# Patient Record
Sex: FEMALE | Race: BLACK OR AFRICAN AMERICAN | NOT HISPANIC OR LATINO | URBAN - METROPOLITAN AREA
[De-identification: names, ages, dates, MRNs, and addresses within clinical notes are randomized per-mention and may not be internally consistent; named-entity substitution may affect disease eponyms.]

---

## 2017-03-29 ENCOUNTER — EMERGENCY (EMERGENCY)
Facility: HOSPITAL | Age: 37
LOS: 1 days | Discharge: HOME | End: 2017-03-29
Attending: EMERGENCY MEDICINE | Admitting: EMERGENCY MEDICINE
Payer: COMMERCIAL

## 2017-03-29 VITALS
DIASTOLIC BLOOD PRESSURE: 64 MMHG | RESPIRATION RATE: 20 BRPM | SYSTOLIC BLOOD PRESSURE: 127 MMHG | HEART RATE: 88 BPM | TEMPERATURE: 98 F | OXYGEN SATURATION: 100 %

## 2017-03-29 DIAGNOSIS — Z98.89 OTHER SPECIFIED POSTPROCEDURAL STATES: Chronic | ICD-10-CM

## 2017-03-29 PROCEDURE — 99283 EMERGENCY DEPT VISIT LOW MDM: CPT

## 2017-03-29 NOTE — ED PROVIDER NOTE - MEDICAL DECISION MAKING DETAILS
37yo F presents with URI Sx. Likely viral. Plan: d/c home, recommended Tylenol, hydation, rest, f/u with PMD, return precautions given. 37yo F presents with URI Sx cw viral URI. Plan: d/c home, recommended Tylenol, hydation, rest, f/u with PMD, return precautions given.

## 2017-03-29 NOTE — ED PROVIDER NOTE - OBJECTIVE STATEMENT
35yo F with no significant PMHx, presents to ED c/o throat pain, dysphagia, intermittent chills for x4d. Pt was at [facility] ED x5d ago visiting her sister. She was sent to ED by her workplace today for evaluation. LNMP a "few" days ago. Denies fever, abd pain, sick contacts. Flu shot not UTD. NKDA.

## 2017-03-29 NOTE — ED PROVIDER NOTE - NS ED MD SCRIBE ATTENDING SCRIBE SECTIONS
VITAL SIGNS( Pullset)/HISTORY OF PRESENT ILLNESS/REVIEW OF SYSTEMS/HIV/DISPOSITION/PAST MEDICAL/SURGICAL/SOCIAL HISTORY/PHYSICAL EXAM

## 2018-04-18 ENCOUNTER — RESULT REVIEW (OUTPATIENT)
Age: 38
End: 2018-04-18

## 2018-08-02 ENCOUNTER — APPOINTMENT (OUTPATIENT)
Dept: ANTEPARTUM | Facility: CLINIC | Age: 38
End: 2018-08-02

## 2018-08-02 ENCOUNTER — ASOB RESULT (OUTPATIENT)
Age: 38
End: 2018-08-02

## 2018-08-02 ENCOUNTER — APPOINTMENT (OUTPATIENT)
Dept: ANTEPARTUM | Facility: CLINIC | Age: 38
End: 2018-08-02
Payer: COMMERCIAL

## 2018-08-02 PROCEDURE — 36415 COLL VENOUS BLD VENIPUNCTURE: CPT

## 2018-08-02 PROCEDURE — 76801 OB US < 14 WKS SINGLE FETUS: CPT

## 2018-08-02 PROCEDURE — 36416 COLLJ CAPILLARY BLOOD SPEC: CPT

## 2018-08-02 PROCEDURE — 99241 OFFICE CONSULTATION NEW/ESTAB PATIENT 15 MIN: CPT | Mod: 25

## 2018-08-02 PROCEDURE — 76813 OB US NUCHAL MEAS 1 GEST: CPT

## 2018-10-22 ENCOUNTER — ASOB RESULT (OUTPATIENT)
Age: 38
End: 2018-10-22

## 2018-10-22 ENCOUNTER — APPOINTMENT (OUTPATIENT)
Dept: ANTEPARTUM | Facility: CLINIC | Age: 38
End: 2018-10-22
Payer: COMMERCIAL

## 2018-10-22 PROCEDURE — 76811 OB US DETAILED SNGL FETUS: CPT

## 2018-10-22 PROCEDURE — 76817 TRANSVAGINAL US OBSTETRIC: CPT | Mod: 59

## 2019-01-02 ENCOUNTER — ASOB RESULT (OUTPATIENT)
Age: 39
End: 2019-01-02

## 2019-01-02 ENCOUNTER — APPOINTMENT (OUTPATIENT)
Dept: ANTEPARTUM | Facility: CLINIC | Age: 39
End: 2019-01-02
Payer: COMMERCIAL

## 2019-01-02 PROCEDURE — 76805 OB US >/= 14 WKS SNGL FETUS: CPT

## 2019-01-02 PROCEDURE — 76819 FETAL BIOPHYS PROFIL W/O NST: CPT

## 2019-01-02 PROCEDURE — 99241 OFFICE CONSULTATION NEW/ESTAB PATIENT 15 MIN: CPT | Mod: 25

## 2019-01-07 ENCOUNTER — OUTPATIENT (OUTPATIENT)
Dept: OUTPATIENT SERVICES | Age: 39
LOS: 1 days | Discharge: HOME | End: 2019-01-07

## 2019-01-07 ENCOUNTER — APPOINTMENT (OUTPATIENT)
Dept: PEDIATRIC CARDIOLOGY | Facility: CLINIC | Age: 39
End: 2019-01-07

## 2019-01-07 DIAGNOSIS — Z98.89 OTHER SPECIFIED POSTPROCEDURAL STATES: Chronic | ICD-10-CM

## 2019-01-08 ENCOUNTER — APPOINTMENT (OUTPATIENT)
Dept: PEDIATRIC CARDIOLOGY | Facility: CLINIC | Age: 39
End: 2019-01-08
Payer: COMMERCIAL

## 2019-01-08 PROCEDURE — 93325 DOPPLER ECHO COLOR FLOW MAPG: CPT | Mod: 59

## 2019-01-08 PROCEDURE — 99242 OFF/OP CONSLTJ NEW/EST SF 20: CPT | Mod: 25

## 2019-01-08 PROCEDURE — 76820 UMBILICAL ARTERY ECHO: CPT

## 2019-01-08 PROCEDURE — 76827 ECHO EXAM OF FETAL HEART: CPT

## 2019-01-08 PROCEDURE — 76825 ECHO EXAM OF FETAL HEART: CPT

## 2019-01-15 ENCOUNTER — OUTPATIENT (OUTPATIENT)
Dept: OUTPATIENT SERVICES | Facility: HOSPITAL | Age: 39
LOS: 1 days | End: 2019-01-15
Payer: COMMERCIAL

## 2019-01-15 DIAGNOSIS — O26.899 OTHER SPECIFIED PREGNANCY RELATED CONDITIONS, UNSPECIFIED TRIMESTER: ICD-10-CM

## 2019-01-15 DIAGNOSIS — Z3A.00 WEEKS OF GESTATION OF PREGNANCY NOT SPECIFIED: ICD-10-CM

## 2019-01-15 DIAGNOSIS — Z98.89 OTHER SPECIFIED POSTPROCEDURAL STATES: Chronic | ICD-10-CM

## 2019-01-15 LAB
ALBUMIN SERPL ELPH-MCNC: 3.1 G/DL — LOW (ref 3.3–5)
ALP SERPL-CCNC: 160 U/L — HIGH (ref 40–120)
ALT FLD-CCNC: 12 U/L — SIGNIFICANT CHANGE UP (ref 4–33)
ANION GAP SERPL CALC-SCNC: 13 MEQ/L — SIGNIFICANT CHANGE UP (ref 7–14)
ANISOCYTOSIS BLD QL: SLIGHT — SIGNIFICANT CHANGE UP
APPEARANCE UR: CLEAR — SIGNIFICANT CHANGE UP
APTT BLD: 24.7 SEC — LOW (ref 27.5–36.3)
AST SERPL-CCNC: 21 U/L — SIGNIFICANT CHANGE UP (ref 4–32)
BACTERIA # UR AUTO: SIGNIFICANT CHANGE UP
BASOPHILS # BLD AUTO: 0.03 K/UL — SIGNIFICANT CHANGE UP (ref 0–0.2)
BASOPHILS NFR BLD AUTO: 0.3 % — SIGNIFICANT CHANGE UP (ref 0–2)
BASOPHILS NFR SPEC: 0 % — SIGNIFICANT CHANGE UP (ref 0–2)
BILIRUB SERPL-MCNC: 0.4 MG/DL — SIGNIFICANT CHANGE UP (ref 0.2–1.2)
BILIRUB UR-MCNC: NEGATIVE — SIGNIFICANT CHANGE UP
BLASTS # FLD: 0 % — SIGNIFICANT CHANGE UP (ref 0–0)
BLOOD UR QL VISUAL: NEGATIVE — SIGNIFICANT CHANGE UP
BUN SERPL-MCNC: 7 MG/DL — SIGNIFICANT CHANGE UP (ref 7–23)
CALCIUM SERPL-MCNC: 8.7 MG/DL — SIGNIFICANT CHANGE UP (ref 8.4–10.5)
CHLORIDE SERPL-SCNC: 105 MMOL/L — SIGNIFICANT CHANGE UP (ref 98–107)
CO2 SERPL-SCNC: 19 MMOL/L — LOW (ref 22–31)
COLOR SPEC: YELLOW — SIGNIFICANT CHANGE UP
CREAT ?TM UR-MCNC: 178.9 MG/DL — SIGNIFICANT CHANGE UP
CREAT SERPL-MCNC: 0.8 MG/DL — SIGNIFICANT CHANGE UP (ref 0.5–1.3)
DACRYOCYTES BLD QL SMEAR: SLIGHT — SIGNIFICANT CHANGE UP
EOSINOPHIL # BLD AUTO: 0.06 K/UL — SIGNIFICANT CHANGE UP (ref 0–0.5)
EOSINOPHIL NFR BLD AUTO: 0.6 % — SIGNIFICANT CHANGE UP (ref 0–6)
EOSINOPHIL NFR FLD: 0.9 % — SIGNIFICANT CHANGE UP (ref 0–6)
FIBRINOGEN PPP-MCNC: 664.2 MG/DL — HIGH (ref 350–510)
GIANT PLATELETS BLD QL SMEAR: PRESENT — SIGNIFICANT CHANGE UP
GLUCOSE SERPL-MCNC: 78 MG/DL — SIGNIFICANT CHANGE UP (ref 70–99)
GLUCOSE UR-MCNC: NEGATIVE — SIGNIFICANT CHANGE UP
HCT VFR BLD CALC: 27.5 % — LOW (ref 34.5–45)
HGB BLD-MCNC: 7.7 G/DL — LOW (ref 11.5–15.5)
HYPOCHROMIA BLD QL: SIGNIFICANT CHANGE UP
IMM GRANULOCYTES NFR BLD AUTO: 1.4 % — SIGNIFICANT CHANGE UP (ref 0–1.5)
INR BLD: 1.01 — SIGNIFICANT CHANGE UP (ref 0.88–1.17)
KETONES UR-MCNC: NEGATIVE — SIGNIFICANT CHANGE UP
LDH SERPL L TO P-CCNC: 285 U/L — HIGH (ref 135–225)
LEUKOCYTE ESTERASE UR-ACNC: NEGATIVE — SIGNIFICANT CHANGE UP
LYMPHOCYTES # BLD AUTO: 1.53 K/UL — SIGNIFICANT CHANGE UP (ref 1–3.3)
LYMPHOCYTES # BLD AUTO: 14.5 % — SIGNIFICANT CHANGE UP (ref 13–44)
LYMPHOCYTES NFR SPEC AUTO: 8.7 % — LOW (ref 13–44)
MCHC RBC-ENTMCNC: 19.2 PG — LOW (ref 27–34)
MCHC RBC-ENTMCNC: 28 % — LOW (ref 32–36)
MCV RBC AUTO: 68.4 FL — LOW (ref 80–100)
METAMYELOCYTES # FLD: 0 % — SIGNIFICANT CHANGE UP (ref 0–1)
MICROCYTES BLD QL: SIGNIFICANT CHANGE UP
MONOCYTES # BLD AUTO: 0.65 K/UL — SIGNIFICANT CHANGE UP (ref 0–0.9)
MONOCYTES NFR BLD AUTO: 6.1 % — SIGNIFICANT CHANGE UP (ref 2–14)
MONOCYTES NFR BLD: 2.6 % — SIGNIFICANT CHANGE UP (ref 2–9)
MYELOCYTES NFR BLD: 0 % — SIGNIFICANT CHANGE UP (ref 0–0)
NEUTROPHIL AB SER-ACNC: 86.9 % — HIGH (ref 43–77)
NEUTROPHILS # BLD AUTO: 8.16 K/UL — HIGH (ref 1.8–7.4)
NEUTROPHILS NFR BLD AUTO: 77.1 % — HIGH (ref 43–77)
NEUTS BAND # BLD: 0 % — SIGNIFICANT CHANGE UP (ref 0–6)
NITRITE UR-MCNC: NEGATIVE — SIGNIFICANT CHANGE UP
NRBC # FLD: 0.13 K/UL — LOW (ref 25–125)
NRBC FLD-RTO: 1.2 — SIGNIFICANT CHANGE UP
OTHER - HEMATOLOGY %: 0 — SIGNIFICANT CHANGE UP
PH UR: 6.5 — SIGNIFICANT CHANGE UP (ref 5–8)
PLATELET # BLD AUTO: 300 K/UL — SIGNIFICANT CHANGE UP (ref 150–400)
PLATELET COUNT - ESTIMATE: NORMAL — SIGNIFICANT CHANGE UP
PMV BLD: 11.3 FL — SIGNIFICANT CHANGE UP (ref 7–13)
POIKILOCYTOSIS BLD QL AUTO: SLIGHT — SIGNIFICANT CHANGE UP
POLYCHROMASIA BLD QL SMEAR: SLIGHT — SIGNIFICANT CHANGE UP
POTASSIUM SERPL-MCNC: 4 MMOL/L — SIGNIFICANT CHANGE UP (ref 3.5–5.3)
POTASSIUM SERPL-SCNC: 4 MMOL/L — SIGNIFICANT CHANGE UP (ref 3.5–5.3)
PROMYELOCYTES # FLD: 0 % — SIGNIFICANT CHANGE UP (ref 0–0)
PROT SERPL-MCNC: 6 G/DL — SIGNIFICANT CHANGE UP (ref 6–8.3)
PROT UR-MCNC: 300 — HIGH
PROT UR-MCNC: 550.8 MG/DL — SIGNIFICANT CHANGE UP
PROTHROM AB SERPL-ACNC: 11.2 SEC — SIGNIFICANT CHANGE UP (ref 9.8–13.1)
RBC # BLD: 4.02 M/UL — SIGNIFICANT CHANGE UP (ref 3.8–5.2)
RBC # FLD: 18.1 % — HIGH (ref 10.3–14.5)
RBC CASTS # UR COMP ASSIST: SIGNIFICANT CHANGE UP (ref 0–?)
SODIUM SERPL-SCNC: 137 MMOL/L — SIGNIFICANT CHANGE UP (ref 135–145)
SP GR SPEC: 1.02 — SIGNIFICANT CHANGE UP (ref 1–1.04)
SQUAMOUS # UR AUTO: SIGNIFICANT CHANGE UP
URATE SERPL-MCNC: 6.1 MG/DL — SIGNIFICANT CHANGE UP (ref 2.5–7)
UROBILINOGEN FLD QL: SIGNIFICANT CHANGE UP
VARIANT LYMPHS # BLD: 0.9 % — SIGNIFICANT CHANGE UP
WBC # BLD: 10.58 K/UL — HIGH (ref 3.8–10.5)
WBC # FLD AUTO: 10.58 K/UL — HIGH (ref 3.8–10.5)
WBC UR QL: HIGH (ref 0–?)

## 2019-01-15 PROCEDURE — 76815 OB US LIMITED FETUS(S): CPT | Mod: 26

## 2019-01-15 PROCEDURE — 99213 OFFICE O/P EST LOW 20 MIN: CPT | Mod: 25

## 2019-01-15 RX ORDER — SODIUM CHLORIDE 9 MG/ML
3 INJECTION INTRAMUSCULAR; INTRAVENOUS; SUBCUTANEOUS ONCE
Qty: 0 | Refills: 0 | Status: COMPLETED | OUTPATIENT
Start: 2019-01-15 | End: 2019-01-15

## 2019-01-15 RX ADMIN — SODIUM CHLORIDE 3 MILLILITER(S): 9 INJECTION INTRAMUSCULAR; INTRAVENOUS; SUBCUTANEOUS at 13:26

## 2019-01-15 RX ADMIN — Medication 12 MILLIGRAM(S): at 13:26

## 2019-01-16 ENCOUNTER — INPATIENT (INPATIENT)
Facility: HOSPITAL | Age: 39
LOS: 5 days | Discharge: HOME | End: 2019-01-22
Attending: OBSTETRICS & GYNECOLOGY | Admitting: OBSTETRICS & GYNECOLOGY
Payer: COMMERCIAL

## 2019-01-16 VITALS — HEIGHT: 60 IN | WEIGHT: 180.78 LBS

## 2019-01-16 DIAGNOSIS — Z98.89 OTHER SPECIFIED POSTPROCEDURAL STATES: Chronic | ICD-10-CM

## 2019-01-16 DIAGNOSIS — Z3A.00 WEEKS OF GESTATION OF PREGNANCY NOT SPECIFIED: ICD-10-CM

## 2019-01-16 DIAGNOSIS — O26.899 OTHER SPECIFIED PREGNANCY RELATED CONDITIONS, UNSPECIFIED TRIMESTER: ICD-10-CM

## 2019-01-16 LAB
ALBUMIN SERPL ELPH-MCNC: 3.3 G/DL — SIGNIFICANT CHANGE UP (ref 3.3–5)
ALBUMIN SERPL ELPH-MCNC: 3.4 G/DL — SIGNIFICANT CHANGE UP (ref 3.3–5)
ALP SERPL-CCNC: 157 U/L — HIGH (ref 40–120)
ALP SERPL-CCNC: 159 U/L — HIGH (ref 40–120)
ALT FLD-CCNC: 10 U/L — SIGNIFICANT CHANGE UP (ref 4–33)
ALT FLD-CCNC: 8 U/L — SIGNIFICANT CHANGE UP (ref 4–33)
ANION GAP SERPL CALC-SCNC: 13 MEQ/L — SIGNIFICANT CHANGE UP (ref 7–14)
ANION GAP SERPL CALC-SCNC: 15 MMO/L — HIGH (ref 7–14)
APPEARANCE UR: CLEAR — SIGNIFICANT CHANGE UP
APTT BLD: 23.5 SEC — LOW (ref 27.5–36.3)
AST SERPL-CCNC: 23 U/L — SIGNIFICANT CHANGE UP (ref 4–32)
AST SERPL-CCNC: 30 U/L — SIGNIFICANT CHANGE UP (ref 4–32)
BACTERIA # UR AUTO: SIGNIFICANT CHANGE UP
BASOPHILS # BLD AUTO: 0.01 K/UL — SIGNIFICANT CHANGE UP (ref 0–0.2)
BASOPHILS NFR BLD AUTO: 0.1 % — SIGNIFICANT CHANGE UP (ref 0–2)
BILIRUB SERPL-MCNC: 0.3 MG/DL — SIGNIFICANT CHANGE UP (ref 0.2–1.2)
BILIRUB SERPL-MCNC: 0.4 MG/DL — SIGNIFICANT CHANGE UP (ref 0.2–1.2)
BILIRUB UR-MCNC: NEGATIVE — SIGNIFICANT CHANGE UP
BLD GP AB SCN SERPL QL: NEGATIVE — SIGNIFICANT CHANGE UP
BLOOD UR QL VISUAL: NEGATIVE — SIGNIFICANT CHANGE UP
BUN SERPL-MCNC: 9 MG/DL — SIGNIFICANT CHANGE UP (ref 7–23)
BUN SERPL-MCNC: 9 MG/DL — SIGNIFICANT CHANGE UP (ref 7–23)
CALCIUM SERPL-MCNC: 9 MG/DL — SIGNIFICANT CHANGE UP (ref 8.4–10.5)
CALCIUM SERPL-MCNC: 9.1 MG/DL — SIGNIFICANT CHANGE UP (ref 8.4–10.5)
CHLORIDE SERPL-SCNC: 104 MMOL/L — SIGNIFICANT CHANGE UP (ref 98–107)
CHLORIDE SERPL-SCNC: 104 MMOL/L — SIGNIFICANT CHANGE UP (ref 98–107)
CO2 SERPL-SCNC: 18 MMOL/L — LOW (ref 22–31)
CO2 SERPL-SCNC: 19 MMOL/L — LOW (ref 22–31)
COLOR SPEC: YELLOW — SIGNIFICANT CHANGE UP
CREAT ?TM UR-MCNC: 195.5 MG/DL — SIGNIFICANT CHANGE UP
CREAT SERPL-MCNC: 0.81 MG/DL — SIGNIFICANT CHANGE UP (ref 0.5–1.3)
CREAT SERPL-MCNC: 0.83 MG/DL — SIGNIFICANT CHANGE UP (ref 0.5–1.3)
EOSINOPHIL # BLD AUTO: 0 K/UL — SIGNIFICANT CHANGE UP (ref 0–0.5)
EOSINOPHIL NFR BLD AUTO: 0 % — SIGNIFICANT CHANGE UP (ref 0–6)
FIBRINOGEN PPP-MCNC: 660 MG/DL — HIGH (ref 350–510)
GLUCOSE SERPL-MCNC: 115 MG/DL — HIGH (ref 70–99)
GLUCOSE SERPL-MCNC: 91 MG/DL — SIGNIFICANT CHANGE UP (ref 70–99)
GLUCOSE UR-MCNC: NEGATIVE — SIGNIFICANT CHANGE UP
HCT VFR BLD CALC: 25.3 % — LOW (ref 34.5–45)
HGB BLD-MCNC: 7.2 G/DL — LOW (ref 11.5–15.5)
IMM GRANULOCYTES NFR BLD AUTO: 2.7 % — HIGH (ref 0–1.5)
INR BLD: 0.96 — SIGNIFICANT CHANGE UP (ref 0.88–1.17)
KETONES UR-MCNC: NEGATIVE — SIGNIFICANT CHANGE UP
LDH SERPL L TO P-CCNC: 302 U/L — HIGH (ref 135–225)
LDH SERPL L TO P-CCNC: 414 U/L — HIGH (ref 135–225)
LEUKOCYTE ESTERASE UR-ACNC: NEGATIVE — SIGNIFICANT CHANGE UP
LYMPHOCYTES # BLD AUTO: 1.03 K/UL — SIGNIFICANT CHANGE UP (ref 1–3.3)
LYMPHOCYTES # BLD AUTO: 5.7 % — LOW (ref 13–44)
MCHC RBC-ENTMCNC: 19.6 PG — LOW (ref 27–34)
MCHC RBC-ENTMCNC: 28.5 % — LOW (ref 32–36)
MCV RBC AUTO: 68.9 FL — LOW (ref 80–100)
MONOCYTES # BLD AUTO: 0.89 K/UL — SIGNIFICANT CHANGE UP (ref 0–0.9)
MONOCYTES NFR BLD AUTO: 4.9 % — SIGNIFICANT CHANGE UP (ref 2–14)
NEUTROPHILS # BLD AUTO: 15.77 K/UL — HIGH (ref 1.8–7.4)
NEUTROPHILS NFR BLD AUTO: 86.6 % — HIGH (ref 43–77)
NITRITE UR-MCNC: NEGATIVE — SIGNIFICANT CHANGE UP
NRBC # FLD: 0.25 K/UL — LOW (ref 25–125)
NRBC FLD-RTO: 1.4 — SIGNIFICANT CHANGE UP
PH UR: 6.5 — SIGNIFICANT CHANGE UP (ref 5–8)
PLATELET # BLD AUTO: 289 K/UL — SIGNIFICANT CHANGE UP (ref 150–400)
PMV BLD: 10.9 FL — SIGNIFICANT CHANGE UP (ref 7–13)
POTASSIUM SERPL-MCNC: 4.3 MMOL/L — SIGNIFICANT CHANGE UP (ref 3.5–5.3)
POTASSIUM SERPL-MCNC: 4.9 MMOL/L — SIGNIFICANT CHANGE UP (ref 3.5–5.3)
POTASSIUM SERPL-SCNC: 4.3 MMOL/L — SIGNIFICANT CHANGE UP (ref 3.5–5.3)
POTASSIUM SERPL-SCNC: 4.9 MMOL/L — SIGNIFICANT CHANGE UP (ref 3.5–5.3)
PROT SERPL-MCNC: 5.8 G/DL — LOW (ref 6–8.3)
PROT SERPL-MCNC: 6.3 G/DL — SIGNIFICANT CHANGE UP (ref 6–8.3)
PROT UR-MCNC: 300 — HIGH
PROT UR-MCNC: 424.4 MG/DL — SIGNIFICANT CHANGE UP
PROTHROM AB SERPL-ACNC: 10.9 SEC — SIGNIFICANT CHANGE UP (ref 9.8–13.1)
RBC # BLD: 3.67 M/UL — LOW (ref 3.8–5.2)
RBC # FLD: 18.2 % — HIGH (ref 10.3–14.5)
RBC CASTS # UR COMP ASSIST: SIGNIFICANT CHANGE UP (ref 0–?)
RH IG SCN BLD-IMP: POSITIVE — SIGNIFICANT CHANGE UP
SODIUM SERPL-SCNC: 136 MMOL/L — SIGNIFICANT CHANGE UP (ref 135–145)
SODIUM SERPL-SCNC: 137 MMOL/L — SIGNIFICANT CHANGE UP (ref 135–145)
SP GR SPEC: 1.02 — SIGNIFICANT CHANGE UP (ref 1–1.04)
SQUAMOUS # UR AUTO: SIGNIFICANT CHANGE UP
URATE SERPL-MCNC: 6.4 MG/DL — SIGNIFICANT CHANGE UP (ref 2.5–7)
URATE SERPL-MCNC: 6.4 MG/DL — SIGNIFICANT CHANGE UP (ref 2.5–7)
UROBILINOGEN FLD QL: NORMAL — SIGNIFICANT CHANGE UP
WBC # BLD: 18.2 K/UL — HIGH (ref 3.8–10.5)
WBC # FLD AUTO: 18.2 K/UL — HIGH (ref 3.8–10.5)

## 2019-01-16 RX ORDER — SODIUM CHLORIDE 9 MG/ML
3 INJECTION INTRAMUSCULAR; INTRAVENOUS; SUBCUTANEOUS ONCE
Qty: 0 | Refills: 0 | Status: COMPLETED | OUTPATIENT
Start: 2019-01-16 | End: 2019-01-16

## 2019-01-16 RX ORDER — SODIUM CHLORIDE 9 MG/ML
1000 INJECTION, SOLUTION INTRAVENOUS
Qty: 0 | Refills: 0 | Status: DISCONTINUED | OUTPATIENT
Start: 2019-01-16 | End: 2019-01-16

## 2019-01-16 RX ADMIN — Medication 12 MILLIGRAM(S): at 14:33

## 2019-01-16 RX ADMIN — SODIUM CHLORIDE 3 MILLILITER(S): 9 INJECTION INTRAMUSCULAR; INTRAVENOUS; SUBCUTANEOUS at 14:16

## 2019-01-16 NOTE — PATIENT PROFILE OB - COMFORT LEVEL, ACCEPTABLE
3
Foreign body  removed in left foot as a child  No significant past surgical history    S/P breast augmentation  reduction  Vaginal delivery  with epidural on Nov 2012

## 2019-01-17 ENCOUNTER — APPOINTMENT (OUTPATIENT)
Dept: ANTEPARTUM | Facility: HOSPITAL | Age: 39
End: 2019-01-17
Payer: COMMERCIAL

## 2019-01-17 ENCOUNTER — ASOB RESULT (OUTPATIENT)
Age: 39
End: 2019-01-17

## 2019-01-17 ENCOUNTER — OUTPATIENT (OUTPATIENT)
Dept: OUTPATIENT SERVICES | Facility: HOSPITAL | Age: 39
LOS: 1 days | End: 2019-01-17

## 2019-01-17 DIAGNOSIS — O14.90 UNSPECIFIED PRE-ECLAMPSIA, UNSPECIFIED TRIMESTER: ICD-10-CM

## 2019-01-17 DIAGNOSIS — Z98.89 OTHER SPECIFIED POSTPROCEDURAL STATES: Chronic | ICD-10-CM

## 2019-01-17 LAB
APTT BLD: 23.3 SEC — LOW (ref 27.5–36.3)
BASOPHILS # BLD AUTO: 0.02 K/UL — SIGNIFICANT CHANGE UP (ref 0–0.2)
BASOPHILS NFR BLD AUTO: 0.1 % — SIGNIFICANT CHANGE UP (ref 0–2)
EOSINOPHIL # BLD AUTO: 0 K/UL — SIGNIFICANT CHANGE UP (ref 0–0.5)
EOSINOPHIL NFR BLD AUTO: 0 % — SIGNIFICANT CHANGE UP (ref 0–6)
FIBRINOGEN PPP-MCNC: 537 MG/DL — HIGH (ref 350–510)
HCT VFR BLD CALC: 21.8 % — LOW (ref 34.5–45)
HCT VFR BLD CALC: 21.9 % — LOW (ref 34.5–45)
HGB BLD-MCNC: 6.1 G/DL — CRITICAL LOW (ref 11.5–15.5)
HGB BLD-MCNC: 6.2 G/DL — CRITICAL LOW (ref 11.5–15.5)
IMM GRANULOCYTES NFR BLD AUTO: 3.6 % — HIGH (ref 0–1.5)
INR BLD: 0.96 — SIGNIFICANT CHANGE UP (ref 0.88–1.17)
LYMPHOCYTES # BLD AUTO: 1.17 K/UL — SIGNIFICANT CHANGE UP (ref 1–3.3)
LYMPHOCYTES # BLD AUTO: 7 % — LOW (ref 13–44)
M PROTEIN 24H MFR UR ELPH: 2508 MG/24 HR — SIGNIFICANT CHANGE UP
MCHC RBC-ENTMCNC: 19.2 PG — LOW (ref 27–34)
MCHC RBC-ENTMCNC: 19.5 PG — LOW (ref 27–34)
MCHC RBC-ENTMCNC: 28 % — LOW (ref 32–36)
MCHC RBC-ENTMCNC: 28.3 % — LOW (ref 32–36)
MCV RBC AUTO: 68.6 FL — LOW (ref 80–100)
MCV RBC AUTO: 68.9 FL — LOW (ref 80–100)
MONOCYTES # BLD AUTO: 0.75 K/UL — SIGNIFICANT CHANGE UP (ref 0–0.9)
MONOCYTES NFR BLD AUTO: 4.5 % — SIGNIFICANT CHANGE UP (ref 2–14)
NEUTROPHILS # BLD AUTO: 14.21 K/UL — HIGH (ref 1.8–7.4)
NEUTROPHILS NFR BLD AUTO: 84.8 % — HIGH (ref 43–77)
NRBC # FLD: 0.2 K/UL — LOW (ref 25–125)
NRBC # FLD: 0.27 K/UL — LOW (ref 25–125)
NRBC FLD-RTO: 1.2 — SIGNIFICANT CHANGE UP
NRBC FLD-RTO: 1.6 — SIGNIFICANT CHANGE UP
PLATELET # BLD AUTO: 241 K/UL — SIGNIFICANT CHANGE UP (ref 150–400)
PLATELET # BLD AUTO: 255 K/UL — SIGNIFICANT CHANGE UP (ref 150–400)
PMV BLD: 10.9 FL — SIGNIFICANT CHANGE UP (ref 7–13)
PMV BLD: 11 FL — SIGNIFICANT CHANGE UP (ref 7–13)
PROTHROM AB SERPL-ACNC: 10.9 SEC — SIGNIFICANT CHANGE UP (ref 9.8–13.1)
RBC # BLD: 3.18 M/UL — LOW (ref 3.8–5.2)
RBC # BLD: 3.18 M/UL — LOW (ref 3.8–5.2)
RBC # FLD: 18.2 % — HIGH (ref 10.3–14.5)
RBC # FLD: 18.2 % — HIGH (ref 10.3–14.5)
SPECIMEN VOL 24H UR: 1200 ML — SIGNIFICANT CHANGE UP
T PALLIDUM AB TITR SER: NEGATIVE — SIGNIFICANT CHANGE UP
WBC # BLD: 16.76 K/UL — HIGH (ref 3.8–10.5)
WBC # BLD: 16.85 K/UL — HIGH (ref 3.8–10.5)
WBC # FLD AUTO: 16.76 K/UL — HIGH (ref 3.8–10.5)
WBC # FLD AUTO: 16.85 K/UL — HIGH (ref 3.8–10.5)

## 2019-01-17 PROCEDURE — 99253 IP/OBS CNSLTJ NEW/EST LOW 45: CPT | Mod: 25

## 2019-01-17 PROCEDURE — 76819 FETAL BIOPHYS PROFIL W/O NST: CPT | Mod: 26

## 2019-01-17 PROCEDURE — 76805 OB US >/= 14 WKS SNGL FETUS: CPT | Mod: 26

## 2019-01-17 RX ORDER — FERROUS SULFATE 325(65) MG
325 TABLET ORAL THREE TIMES A DAY
Qty: 0 | Refills: 0 | Status: DISCONTINUED | OUTPATIENT
Start: 2019-01-17 | End: 2019-01-19

## 2019-01-17 RX ADMIN — Medication 325 MILLIGRAM(S): at 06:05

## 2019-01-17 RX ADMIN — Medication 1 TABLET(S): at 11:36

## 2019-01-17 RX ADMIN — Medication 325 MILLIGRAM(S): at 21:45

## 2019-01-17 RX ADMIN — Medication 325 MILLIGRAM(S): at 14:50

## 2019-01-17 NOTE — PROGRESS NOTE ADULT - SUBJECTIVE AND OBJECTIVE BOX
Attending Antepartum Note    She is a  38y woman  at 36 weeks admitted for severe PEC, severe anemia. patient was seen and evaluated at bedside. She denies SOB, CP, h/a or changes in vision, no leg pain, feels uterine tightening at times, feels good FM consistently  H/O: C-sectionx2 for PEC   Physical exam:  She generally looks and feels well  Other:    Vital Signs Last 24 Hrs  T(C): 36.2 (2019 14:20), Max: 37.6 (2019 01:50)  T(F): 97.1 (2019 14:20), Max: 99.7 (2019 01:50)  HR: 88 (2019 14:51) (88 - 110)  BP: 133/71 (2019 14:51) (124/62 - 135/72)  RR: 18 (2019 14:51) (16 - 18)  SpO2: 100% (2019 14:51) (99% - 100%)    Gen: NAD A+Ox3, comfortable in bed  Abdomen: Soft, nontender, no distension, gravid  Fundus: not tender  Pelvic: deferred  Ext: No DVT signs, warm extremities,   Allergies    No Known Allergies    MEDICATIONS  (STANDING):  ferrous    sulfate 325 milliGRAM(s) Oral three times a day  prenatal multivitamin 1 Tablet(s) Oral daily    MEDICATIONS  (PRN):      LABS:                        6.2    16.85 )-----------( 255      ( 2019 06:11 )             21.9                         6.1    16.76 )-----------( 241      ( 2019 05:10 )             21.8                         7.2    18.20 )-----------( 289      ( 2019 14:16 )             25.3     2019 16:41    137    |  104    |  9      ----------------------------<  91     4.9     |  18<L>  |  0.83   2019 14:16    136    |  104    |  9      ----------------------------<  115<H>  4.3     |  19<L>  |  0.81     Ca    9.0        2019 16:41  Ca    9.1        2019 14:16    TPro  5.8<L>  /  Alb  3.3    /  TBili  0.4    /  DBili  x      /  AST  30     /  ALT  10     /  AlkPhos  159<H>  2019 16:41  TPro  6.3    /  Alb  3.4    /  TBili  0.3    /  DBili  x      /  AST  23     /  ALT  8      /  AlkPhos  157<H>  2019 14:16    PT/INR - ( 2019 05:10 )   PT: 10.9 SEC;   INR: 0.96       Urine output adequate  PTT - ( 2019 05:10 )  PTT:23.3 SEC  Urinalysis Basic - ( 2019 14:16 )    Color: YELLOW / Appearance: CLEAR / S.022 / pH: 6.5  Gluc: NEGATIVE / Ketone: NEGATIVE  / Bili: NEGATIVE / Urobili: NORMAL   Blood: NEGATIVE / Protein: 300 / Nitrite: NEGATIVE   Leuk Esterase: NEGATIVE / RBC: 0-2 / WBC x   Sq Epi: FEW / Non Sq Epi: x / Bacteria: FEW

## 2019-01-17 NOTE — PROGRESS NOTE ADULT - ASSESSMENT
37 yo G2 at 36 weeks admitted for PEC, severe anemia. Presently stable. 24 hour urine over 2 g of protein.   BP WNR, patient on iron therapy for anemia. h/h stable. Continue antepartum mgmt as per MFM.   anticipate delivery at early term, unless indicated earlier by fetal/maternal status.

## 2019-01-18 ENCOUNTER — TRANSCRIPTION ENCOUNTER (OUTPATIENT)
Age: 39
End: 2019-01-18

## 2019-01-18 LAB
ALBUMIN SERPL ELPH-MCNC: 3.1 G/DL — LOW (ref 3.3–5)
ALP SERPL-CCNC: 145 U/L — HIGH (ref 40–120)
ALT FLD-CCNC: 11 U/L — SIGNIFICANT CHANGE UP (ref 4–33)
ANION GAP SERPL CALC-SCNC: 12 MMO/L — SIGNIFICANT CHANGE UP (ref 7–14)
APTT BLD: 22 SEC — LOW (ref 27.5–36.3)
AST SERPL-CCNC: 25 U/L — SIGNIFICANT CHANGE UP (ref 4–32)
BASOPHILS # BLD AUTO: 0.04 K/UL — SIGNIFICANT CHANGE UP (ref 0–0.2)
BASOPHILS NFR BLD AUTO: 0.2 % — SIGNIFICANT CHANGE UP (ref 0–2)
BILIRUB SERPL-MCNC: 0.3 MG/DL — SIGNIFICANT CHANGE UP (ref 0.2–1.2)
BUN SERPL-MCNC: 10 MG/DL — SIGNIFICANT CHANGE UP (ref 7–23)
CALCIUM SERPL-MCNC: 8.7 MG/DL — SIGNIFICANT CHANGE UP (ref 8.4–10.5)
CHLORIDE SERPL-SCNC: 108 MMOL/L — HIGH (ref 98–107)
CO2 SERPL-SCNC: 20 MMOL/L — LOW (ref 22–31)
CREAT SERPL-MCNC: 0.89 MG/DL — SIGNIFICANT CHANGE UP (ref 0.5–1.3)
EOSINOPHIL # BLD AUTO: 0.02 K/UL — SIGNIFICANT CHANGE UP (ref 0–0.5)
EOSINOPHIL NFR BLD AUTO: 0.1 % — SIGNIFICANT CHANGE UP (ref 0–6)
FERRITIN SERPL-MCNC: 16.04 NG/ML — SIGNIFICANT CHANGE UP (ref 15–150)
FIBRINOGEN PPP-MCNC: 517.9 MG/DL — HIGH (ref 350–510)
GLUCOSE SERPL-MCNC: 94 MG/DL — SIGNIFICANT CHANGE UP (ref 70–99)
HCT VFR BLD CALC: 24 % — LOW (ref 34.5–45)
HCT VFR BLD CALC: 25.1 % — LOW (ref 34.5–45)
HGB BLD-MCNC: 6.6 G/DL — CRITICAL LOW (ref 11.5–15.5)
HGB BLD-MCNC: 6.8 G/DL — CRITICAL LOW (ref 11.5–15.5)
IMM GRANULOCYTES NFR BLD AUTO: 8.1 % — HIGH (ref 0–1.5)
INR BLD: 0.97 — SIGNIFICANT CHANGE UP (ref 0.88–1.17)
IRON SATN MFR SERPL: 141 UG/DL — SIGNIFICANT CHANGE UP (ref 30–160)
IRON SATN MFR SERPL: 431 UG/DL — SIGNIFICANT CHANGE UP (ref 140–530)
LDH SERPL L TO P-CCNC: 317 U/L — HIGH (ref 135–225)
LYMPHOCYTES # BLD AUTO: 1.89 K/UL — SIGNIFICANT CHANGE UP (ref 1–3.3)
LYMPHOCYTES # BLD AUTO: 9 % — LOW (ref 13–44)
MCHC RBC-ENTMCNC: 19.1 PG — LOW (ref 27–34)
MCHC RBC-ENTMCNC: 19.3 PG — LOW (ref 27–34)
MCHC RBC-ENTMCNC: 27.1 % — LOW (ref 32–36)
MCHC RBC-ENTMCNC: 27.5 % — LOW (ref 32–36)
MCV RBC AUTO: 70.2 FL — LOW (ref 80–100)
MCV RBC AUTO: 70.5 FL — LOW (ref 80–100)
MONOCYTES # BLD AUTO: 1.72 K/UL — HIGH (ref 0–0.9)
MONOCYTES NFR BLD AUTO: 8.2 % — SIGNIFICANT CHANGE UP (ref 2–14)
NEUTROPHILS # BLD AUTO: 15.59 K/UL — HIGH (ref 1.8–7.4)
NEUTROPHILS NFR BLD AUTO: 74.4 % — SIGNIFICANT CHANGE UP (ref 43–77)
NRBC # FLD: 0.77 K/UL — LOW (ref 25–125)
NRBC # FLD: 1.01 K/UL — LOW (ref 25–125)
NRBC FLD-RTO: 3.7 — SIGNIFICANT CHANGE UP
NRBC FLD-RTO: 4.6 — SIGNIFICANT CHANGE UP
OB PNL STL: NEGATIVE — SIGNIFICANT CHANGE UP
PLATELET # BLD AUTO: 260 K/UL — SIGNIFICANT CHANGE UP (ref 150–400)
PLATELET # BLD AUTO: 265 K/UL — SIGNIFICANT CHANGE UP (ref 150–400)
PMV BLD: 10.9 FL — SIGNIFICANT CHANGE UP (ref 7–13)
PMV BLD: 11.1 FL — SIGNIFICANT CHANGE UP (ref 7–13)
POTASSIUM SERPL-MCNC: 4.3 MMOL/L — SIGNIFICANT CHANGE UP (ref 3.5–5.3)
POTASSIUM SERPL-SCNC: 4.3 MMOL/L — SIGNIFICANT CHANGE UP (ref 3.5–5.3)
PROT SERPL-MCNC: 5.8 G/DL — LOW (ref 6–8.3)
PROTHROM AB SERPL-ACNC: 10.8 SEC — SIGNIFICANT CHANGE UP (ref 9.8–13.1)
RBC # BLD: 3.42 M/UL — LOW (ref 3.8–5.2)
RBC # BLD: 3.56 M/UL — LOW (ref 3.8–5.2)
RBC # FLD: 18.5 % — HIGH (ref 10.3–14.5)
RBC # FLD: 18.6 % — HIGH (ref 10.3–14.5)
SODIUM SERPL-SCNC: 140 MMOL/L — SIGNIFICANT CHANGE UP (ref 135–145)
UIBC SERPL-MCNC: 290.2 UG/DL — SIGNIFICANT CHANGE UP (ref 110–370)
URATE SERPL-MCNC: 7 MG/DL — SIGNIFICANT CHANGE UP (ref 2.5–7)
WBC # BLD: 20.96 K/UL — HIGH (ref 3.8–10.5)
WBC # BLD: 21.78 K/UL — HIGH (ref 3.8–10.5)
WBC # FLD AUTO: 20.96 K/UL — HIGH (ref 3.8–10.5)
WBC # FLD AUTO: 21.78 K/UL — HIGH (ref 3.8–10.5)

## 2019-01-18 RX ORDER — ACETAMINOPHEN 500 MG
650 TABLET ORAL ONCE
Qty: 0 | Refills: 0 | Status: COMPLETED | OUTPATIENT
Start: 2019-01-18 | End: 2019-01-18

## 2019-01-18 RX ORDER — ACETAMINOPHEN 500 MG
650 TABLET ORAL EVERY 6 HOURS
Qty: 0 | Refills: 0 | Status: DISCONTINUED | OUTPATIENT
Start: 2019-01-18 | End: 2019-01-19

## 2019-01-18 RX ORDER — DIPHENHYDRAMINE HCL 50 MG
25 CAPSULE ORAL ONCE
Qty: 0 | Refills: 0 | Status: COMPLETED | OUTPATIENT
Start: 2019-01-18 | End: 2019-01-18

## 2019-01-18 RX ORDER — DIPHENHYDRAMINE HCL 50 MG
25 CAPSULE ORAL EVERY 4 HOURS
Qty: 0 | Refills: 0 | Status: DISCONTINUED | OUTPATIENT
Start: 2019-01-18 | End: 2019-01-18

## 2019-01-18 RX ADMIN — Medication 650 MILLIGRAM(S): at 23:50

## 2019-01-18 RX ADMIN — Medication 325 MILLIGRAM(S): at 13:00

## 2019-01-18 RX ADMIN — Medication 1 TABLET(S): at 12:53

## 2019-01-18 RX ADMIN — Medication 650 MILLIGRAM(S): at 09:23

## 2019-01-18 RX ADMIN — Medication 25 MILLIGRAM(S): at 23:49

## 2019-01-18 RX ADMIN — Medication 325 MILLIGRAM(S): at 23:51

## 2019-01-18 RX ADMIN — Medication 650 MILLIGRAM(S): at 08:47

## 2019-01-18 RX ADMIN — Medication 325 MILLIGRAM(S): at 06:21

## 2019-01-18 NOTE — PROGRESS NOTE ADULT - SUBJECTIVE AND OBJECTIVE BOX
Antepartum Note: IUP at 36+1/7 weeks, Severe PEC, Severe Anemia, Previous C/S x 2, PTD x 2    Subjective:      Physical exam:    Vital Signs Last 24 Hrs  T(C): 37 (18 Jan 2019 06:50), Max: 37 (18 Jan 2019 06:40)  T(F): 98.6 (18 Jan 2019 06:50), Max: 98.6 (18 Jan 2019 06:40)  HR: 78 (18 Jan 2019 06:50) (74 - 92)  BP: 146/90 (18 Jan 2019 06:50) (124/62 - 146/90)  BP(mean): --  RR: 18 (18 Jan 2019 06:50) (17 - 20)  SpO2: 100% (18 Jan 2019 06:50) (100% - 100%)    Gen: NAD  CVS:   Lungs:  Abdomen:   Ext: No calf tenderness    NST:     LABS:                        6.6    20.96 )-----------( 265      ( 18 Jan 2019 06:49 )             24.0       Rubella status:     Allergies    No Known Allergies    Intolerances      MEDICATIONS  (STANDING):  ferrous    sulfate 325 milliGRAM(s) Oral three times a day  prenatal multivitamin 1 Tablet(s) Oral daily    MEDICATIONS  (PRN):  acetaminophen   Tablet .. 650 milliGRAM(s) Oral every 6 hours PRN Moderate Pain (4 - 6) Antepartum Note: IUP at 36+1/7 weeks, Severe PEC, Severe Anemia, Previous C/S x 2, PTD x 2    Subjective:    Pt. was seen and examined at bedside. Denies any C/P, SOB, N/V, dizziness, weakness, fatigue, fever/chills, or any urinary F/U/D. Had Ctx last night.   C/O headache mild, took Tylenol. No change of vision, RUQ pain, VB, LOF. Denies calf pain. FM is normal.    Physical exam:    Vital Signs Last 24 Hrs  T(C): 37 (18 Jan 2019 06:50), Max: 37 (18 Jan 2019 06:40)  T(F): 98.6 (18 Jan 2019 06:50), Max: 98.6 (18 Jan 2019 06:40)  HR: 78 (18 Jan 2019 06:50) (74 - 92)  BP: 146/90 (18 Jan 2019 06:50) (124/62 - 146/90)  BP(mean): --  RR: 18 (18 Jan 2019 06:50) (17 - 20)  SpO2: 100% (18 Jan 2019 06:50) (100% - 100%)    Gen: NAD, AAO x1  CVS: Positive S1S2, RRR  Lungs: CTA B/L, No W/R/R  Abdomen: Soft, NT, Gravid  Ext: No calf tenderness, +1 edema B/L    NST: Reactive    LABS:                        6.6    20.96 )-----------( 265      ( 18 Jan 2019 06:49 )             24.0       Rubella status:     Allergies    No Known Allergies    Intolerances      MEDICATIONS  (STANDING):  ferrous    sulfate 325 milliGRAM(s) Oral three times a day  prenatal multivitamin 1 Tablet(s) Oral daily    MEDICATIONS  (PRN):  acetaminophen   Tablet .. 650 milliGRAM(s) Oral every 6 hours PRN Moderate Pain (4 - 6)

## 2019-01-18 NOTE — PROGRESS NOTE ADULT - ASSESSMENT
A/P: , IUP at 36+1/7 weeks, Severe PEC, Severe Anemia, Previous C/S x 2, PTD x 2, AMA    Continue PO Iron TID  Will discuss with MFM about possible Transfusion as patient is for Repeat C/S A/P: , IUP at 36+1/7 weeks, Severe PEC, Severe Anemia, Previous C/S x 2, PTD x 2, AMA    Continue PO Iron TID  Pt. is asymptomatic  Will discuss with MFM about possible Transfusion as patient is for Repeat C/S  Continue NST BID and BPP twice weekly  BP mostly in mild range  24 hour urine protein 2508 mg/dl  Delivery plan per MFM  CBC in am A/P: , IUP at 36+1/7 weeks, Severe PEC, Severe Anemia, Previous C/S x 2, PTD x 2, AMA    Continue PO Iron TID  Pt. is asymptomatic  PEC labs are mostly WNL except LDH is elevated and pt. has nucleated RBC (immature)  Suspect??Hemolysis  Will discuss with MFM about possible Transfusion as patient is for Repeat C/S and Delivery plan  Continue NST BID and BPP twice weekly  BP mostly in mild range  24 hour urine protein 2508 mg/dl  CBC in am A/P: , IUP at 36+1/7 weeks, Severe PEC, Severe Anemia, Previous C/S x 2, PTD x 2, AMA    Continue PO Iron TID  Pt. is asymptomatic  PEC labs are mostly WNL except LDH is elevated and pt. has nucleated RBC (immature)  Suspect??Hemolysis  Will discuss with M about possible Transfusion as patient is for Repeat C/S and Delivery plan  Continue NST BID and BPP twice weekly  BP mostly in mild range  24 hour urine protein 2508 mg/dl  CBC in am  S/P BMZ x 2    Addendum: Late entry at 1855 pm    Case was discussed with Dr. Rahman and Dr. Power ([facility])  Due to severe anemia, advised transfusion 2U PRBCs as she will have  delivery  Pt. has headache even though her BP is NOT in SEVERE range  She will be observed in patient and possible delivery at 37 weeks unless indicated sooner.

## 2019-01-19 ENCOUNTER — RESULT REVIEW (OUTPATIENT)
Age: 39
End: 2019-01-19

## 2019-01-19 LAB
ALBUMIN SERPL ELPH-MCNC: 2.2 G/DL — LOW (ref 3.3–5)
ALBUMIN SERPL ELPH-MCNC: 2.8 G/DL — LOW (ref 3.3–5)
ALP SERPL-CCNC: 121 U/L — HIGH (ref 40–120)
ALP SERPL-CCNC: 160 U/L — HIGH (ref 40–120)
ALT FLD-CCNC: 11 U/L — SIGNIFICANT CHANGE UP (ref 4–33)
ALT FLD-CCNC: 14 U/L — SIGNIFICANT CHANGE UP (ref 4–33)
ANION GAP SERPL CALC-SCNC: 12 MMO/L — SIGNIFICANT CHANGE UP (ref 7–14)
ANION GAP SERPL CALC-SCNC: 12 MMO/L — SIGNIFICANT CHANGE UP (ref 7–14)
ANION GAP SERPL CALC-SCNC: 14 MMO/L — SIGNIFICANT CHANGE UP (ref 7–14)
ANISOCYTOSIS BLD QL: SIGNIFICANT CHANGE UP
APTT BLD: 23.2 SEC — LOW (ref 27.5–36.3)
APTT BLD: 23.5 SEC — LOW (ref 27.5–36.3)
AST SERPL-CCNC: 37 U/L — HIGH (ref 4–32)
AST SERPL-CCNC: 40 U/L — HIGH (ref 4–32)
BASOPHILS # BLD AUTO: 0.08 K/UL — SIGNIFICANT CHANGE UP (ref 0–0.2)
BASOPHILS # BLD AUTO: 0.1 K/UL — SIGNIFICANT CHANGE UP (ref 0–0.2)
BASOPHILS NFR BLD AUTO: 0.3 % — SIGNIFICANT CHANGE UP (ref 0–2)
BASOPHILS NFR BLD AUTO: 0.5 % — SIGNIFICANT CHANGE UP (ref 0–2)
BASOPHILS NFR SPEC: 0 % — SIGNIFICANT CHANGE UP (ref 0–2)
BILIRUB SERPL-MCNC: 0.4 MG/DL — SIGNIFICANT CHANGE UP (ref 0.2–1.2)
BILIRUB SERPL-MCNC: 0.4 MG/DL — SIGNIFICANT CHANGE UP (ref 0.2–1.2)
BLASTS # FLD: 0 % — SIGNIFICANT CHANGE UP (ref 0–0)
BLD GP AB SCN SERPL QL: NEGATIVE — SIGNIFICANT CHANGE UP
BUN SERPL-MCNC: 10 MG/DL — SIGNIFICANT CHANGE UP (ref 7–23)
BUN SERPL-MCNC: 9 MG/DL — SIGNIFICANT CHANGE UP (ref 7–23)
BUN SERPL-MCNC: 9 MG/DL — SIGNIFICANT CHANGE UP (ref 7–23)
CALCIUM SERPL-MCNC: 7.3 MG/DL — LOW (ref 8.4–10.5)
CALCIUM SERPL-MCNC: 7.8 MG/DL — LOW (ref 8.4–10.5)
CALCIUM SERPL-MCNC: 8.6 MG/DL — SIGNIFICANT CHANGE UP (ref 8.4–10.5)
CHLORIDE SERPL-SCNC: 105 MMOL/L — SIGNIFICANT CHANGE UP (ref 98–107)
CHLORIDE SERPL-SCNC: 107 MMOL/L — SIGNIFICANT CHANGE UP (ref 98–107)
CHLORIDE SERPL-SCNC: 108 MMOL/L — HIGH (ref 98–107)
CO2 SERPL-SCNC: 17 MMOL/L — LOW (ref 22–31)
CO2 SERPL-SCNC: 17 MMOL/L — LOW (ref 22–31)
CO2 SERPL-SCNC: 18 MMOL/L — LOW (ref 22–31)
CREAT SERPL-MCNC: 0.86 MG/DL — SIGNIFICANT CHANGE UP (ref 0.5–1.3)
CREAT SERPL-MCNC: 0.91 MG/DL — SIGNIFICANT CHANGE UP (ref 0.5–1.3)
CREAT SERPL-MCNC: 0.92 MG/DL — SIGNIFICANT CHANGE UP (ref 0.5–1.3)
D DIMER BLD IA.RAPID-MCNC: 9125 NG/ML — SIGNIFICANT CHANGE UP
EOSINOPHIL # BLD AUTO: 0.04 K/UL — SIGNIFICANT CHANGE UP (ref 0–0.5)
EOSINOPHIL # BLD AUTO: 0.05 K/UL — SIGNIFICANT CHANGE UP (ref 0–0.5)
EOSINOPHIL NFR BLD AUTO: 0.2 % — SIGNIFICANT CHANGE UP (ref 0–6)
EOSINOPHIL NFR BLD AUTO: 0.2 % — SIGNIFICANT CHANGE UP (ref 0–6)
EOSINOPHIL NFR FLD: 0 % — SIGNIFICANT CHANGE UP (ref 0–6)
FIBRINOGEN PPP-MCNC: 357 MG/DL — SIGNIFICANT CHANGE UP (ref 350–510)
FIBRINOGEN PPP-MCNC: 479 MG/DL — SIGNIFICANT CHANGE UP (ref 350–510)
GIANT PLATELETS BLD QL SMEAR: PRESENT — SIGNIFICANT CHANGE UP
GLUCOSE SERPL-MCNC: 110 MG/DL — HIGH (ref 70–99)
GLUCOSE SERPL-MCNC: 189 MG/DL — HIGH (ref 70–99)
GLUCOSE SERPL-MCNC: 85 MG/DL — SIGNIFICANT CHANGE UP (ref 70–99)
HCT VFR BLD CALC: 24.7 % — LOW (ref 34.5–45)
HCT VFR BLD CALC: 29.6 % — LOW (ref 34.5–45)
HCT VFR BLD CALC: 42.3 % — SIGNIFICANT CHANGE UP (ref 34.5–45)
HGB BLD-MCNC: 13.9 G/DL — SIGNIFICANT CHANGE UP (ref 11.5–15.5)
HGB BLD-MCNC: 7.3 G/DL — LOW (ref 11.5–15.5)
HGB BLD-MCNC: 8.6 G/DL — LOW (ref 11.5–15.5)
IMM GRANULOCYTES NFR BLD AUTO: 6.7 % — HIGH (ref 0–1.5)
IMM GRANULOCYTES NFR BLD AUTO: 7.4 % — HIGH (ref 0–1.5)
INR BLD: 0.96 — SIGNIFICANT CHANGE UP (ref 0.88–1.17)
INR BLD: 1.01 — SIGNIFICANT CHANGE UP (ref 0.88–1.17)
LACTATE SERPL-SCNC: 3.5 MMOL/L — HIGH (ref 0.5–2)
LACTATE SERPL-SCNC: 5.2 MMOL/L — CRITICAL HIGH (ref 0.5–2)
LDH SERPL L TO P-CCNC: 441 U/L — HIGH (ref 135–225)
LYMPHOCYTES # BLD AUTO: 10.1 % — LOW (ref 13–44)
LYMPHOCYTES # BLD AUTO: 12.2 % — LOW (ref 13–44)
LYMPHOCYTES # BLD AUTO: 2.16 K/UL — SIGNIFICANT CHANGE UP (ref 1–3.3)
LYMPHOCYTES # BLD AUTO: 3.02 K/UL — SIGNIFICANT CHANGE UP (ref 1–3.3)
LYMPHOCYTES NFR SPEC AUTO: 5.6 % — LOW (ref 13–44)
MACROCYTES BLD QL: SLIGHT — SIGNIFICANT CHANGE UP
MANUAL SMEAR VERIFICATION: SIGNIFICANT CHANGE UP
MCHC RBC-ENTMCNC: 21.3 PG — LOW (ref 27–34)
MCHC RBC-ENTMCNC: 22.2 PG — LOW (ref 27–34)
MCHC RBC-ENTMCNC: 26.5 PG — LOW (ref 27–34)
MCHC RBC-ENTMCNC: 29.1 % — LOW (ref 32–36)
MCHC RBC-ENTMCNC: 29.6 % — LOW (ref 32–36)
MCHC RBC-ENTMCNC: 32.9 % — SIGNIFICANT CHANGE UP (ref 32–36)
MCV RBC AUTO: 73.4 FL — LOW (ref 80–100)
MCV RBC AUTO: 75.1 FL — LOW (ref 80–100)
MCV RBC AUTO: 80.6 FL — SIGNIFICANT CHANGE UP (ref 80–100)
METAMYELOCYTES # FLD: 2.8 % — HIGH (ref 0–1)
MICROCYTES BLD QL: SIGNIFICANT CHANGE UP
MONOCYTES # BLD AUTO: 1.64 K/UL — HIGH (ref 0–0.9)
MONOCYTES # BLD AUTO: 2.19 K/UL — HIGH (ref 0–0.9)
MONOCYTES NFR BLD AUTO: 10.2 % — SIGNIFICANT CHANGE UP (ref 2–14)
MONOCYTES NFR BLD AUTO: 6.6 % — SIGNIFICANT CHANGE UP (ref 2–14)
MONOCYTES NFR BLD: 2.8 % — SIGNIFICANT CHANGE UP (ref 2–9)
MYELOCYTES NFR BLD: 0.9 % — HIGH (ref 0–0)
NEUTROPHIL AB SER-ACNC: 84.1 % — HIGH (ref 43–77)
NEUTROPHILS # BLD AUTO: 15.33 K/UL — HIGH (ref 1.8–7.4)
NEUTROPHILS # BLD AUTO: 18.37 K/UL — HIGH (ref 1.8–7.4)
NEUTROPHILS NFR BLD AUTO: 71.6 % — SIGNIFICANT CHANGE UP (ref 43–77)
NEUTROPHILS NFR BLD AUTO: 74 % — SIGNIFICANT CHANGE UP (ref 43–77)
NEUTS BAND # BLD: 1.9 % — SIGNIFICANT CHANGE UP (ref 0–6)
NRBC # BLD: 12.1 /100WBC — SIGNIFICANT CHANGE UP
NRBC # FLD: 0.73 K/UL — LOW (ref 25–125)
NRBC # FLD: 1.04 K/UL — LOW (ref 25–125)
NRBC # FLD: 1.71 K/UL — LOW (ref 25–125)
NRBC FLD-RTO: 2.6 — SIGNIFICANT CHANGE UP
NRBC FLD-RTO: 4.9 — SIGNIFICANT CHANGE UP
NRBC FLD-RTO: 6.9 — SIGNIFICANT CHANGE UP
OTHER - HEMATOLOGY %: 0 — SIGNIFICANT CHANGE UP
PLATELET # BLD AUTO: 142 K/UL — LOW (ref 150–400)
PLATELET # BLD AUTO: 222 K/UL — SIGNIFICANT CHANGE UP (ref 150–400)
PLATELET # BLD AUTO: 237 K/UL — SIGNIFICANT CHANGE UP (ref 150–400)
PLATELET COUNT - ESTIMATE: NORMAL — SIGNIFICANT CHANGE UP
PMV BLD: 10.7 FL — SIGNIFICANT CHANGE UP (ref 7–13)
PMV BLD: 10.9 FL — SIGNIFICANT CHANGE UP (ref 7–13)
PMV BLD: 11.1 FL — SIGNIFICANT CHANGE UP (ref 7–13)
POTASSIUM SERPL-MCNC: 4 MMOL/L — SIGNIFICANT CHANGE UP (ref 3.5–5.3)
POTASSIUM SERPL-MCNC: 4.7 MMOL/L — SIGNIFICANT CHANGE UP (ref 3.5–5.3)
POTASSIUM SERPL-MCNC: 4.7 MMOL/L — SIGNIFICANT CHANGE UP (ref 3.5–5.3)
POTASSIUM SERPL-SCNC: 4 MMOL/L — SIGNIFICANT CHANGE UP (ref 3.5–5.3)
POTASSIUM SERPL-SCNC: 4.7 MMOL/L — SIGNIFICANT CHANGE UP (ref 3.5–5.3)
POTASSIUM SERPL-SCNC: 4.7 MMOL/L — SIGNIFICANT CHANGE UP (ref 3.5–5.3)
PROMYELOCYTES # FLD: 0 % — SIGNIFICANT CHANGE UP (ref 0–0)
PROT SERPL-MCNC: 3.9 G/DL — LOW (ref 6–8.3)
PROT SERPL-MCNC: 5.6 G/DL — LOW (ref 6–8.3)
PROTHROM AB SERPL-ACNC: 10.9 SEC — SIGNIFICANT CHANGE UP (ref 9.8–13.1)
PROTHROM AB SERPL-ACNC: 11.5 SEC — SIGNIFICANT CHANGE UP (ref 9.8–13.1)
RBC # BLD: 3.29 M/UL — LOW (ref 3.8–5.2)
RBC # BLD: 4.03 M/UL — SIGNIFICANT CHANGE UP (ref 3.8–5.2)
RBC # BLD: 5.25 M/UL — HIGH (ref 3.8–5.2)
RBC # FLD: 20 % — HIGH (ref 10.3–14.5)
RBC # FLD: 20.8 % — HIGH (ref 10.3–14.5)
RBC # FLD: 21.8 % — HIGH (ref 10.3–14.5)
RH IG SCN BLD-IMP: POSITIVE — SIGNIFICANT CHANGE UP
SODIUM SERPL-SCNC: 136 MMOL/L — SIGNIFICANT CHANGE UP (ref 135–145)
SODIUM SERPL-SCNC: 136 MMOL/L — SIGNIFICANT CHANGE UP (ref 135–145)
SODIUM SERPL-SCNC: 138 MMOL/L — SIGNIFICANT CHANGE UP (ref 135–145)
URATE SERPL-MCNC: 7.1 MG/DL — HIGH (ref 2.5–7)
VARIANT LYMPHS # BLD: 1.9 % — SIGNIFICANT CHANGE UP
WBC # BLD: 21.42 K/UL — HIGH (ref 3.8–10.5)
WBC # BLD: 24.8 K/UL — HIGH (ref 3.8–10.5)
WBC # BLD: 28.35 K/UL — HIGH (ref 3.8–10.5)
WBC # FLD AUTO: 21.42 K/UL — HIGH (ref 3.8–10.5)
WBC # FLD AUTO: 24.8 K/UL — HIGH (ref 3.8–10.5)
WBC # FLD AUTO: 28.35 K/UL — HIGH (ref 3.8–10.5)

## 2019-01-19 PROCEDURE — 88307 TISSUE EXAM BY PATHOLOGIST: CPT | Mod: 26

## 2019-01-19 PROCEDURE — 99291 CRITICAL CARE FIRST HOUR: CPT

## 2019-01-19 RX ORDER — OXYCODONE HYDROCHLORIDE 5 MG/1
5 TABLET ORAL
Qty: 0 | Refills: 0 | Status: COMPLETED | OUTPATIENT
Start: 2019-01-20 | End: 2019-01-27

## 2019-01-19 RX ORDER — LANOLIN
1 OINTMENT (GRAM) TOPICAL
Qty: 0 | Refills: 0 | Status: DISCONTINUED | OUTPATIENT
Start: 2019-01-20 | End: 2019-01-22

## 2019-01-19 RX ORDER — MAGNESIUM SULFATE 500 MG/ML
1.5 VIAL (ML) INJECTION
Qty: 40 | Refills: 0 | Status: DISCONTINUED | OUTPATIENT
Start: 2019-01-19 | End: 2019-01-20

## 2019-01-19 RX ORDER — OXYCODONE HYDROCHLORIDE 5 MG/1
5 TABLET ORAL EVERY 4 HOURS
Qty: 0 | Refills: 0 | Status: COMPLETED | OUTPATIENT
Start: 2019-01-20 | End: 2019-01-27

## 2019-01-19 RX ORDER — FAMOTIDINE 10 MG/ML
20 INJECTION INTRAVENOUS ONCE
Qty: 0 | Refills: 0 | Status: COMPLETED | OUTPATIENT
Start: 2019-01-19 | End: 2019-01-19

## 2019-01-19 RX ORDER — LABETALOL HCL 100 MG
20 TABLET ORAL ONCE
Qty: 0 | Refills: 0 | Status: COMPLETED | OUTPATIENT
Start: 2019-01-19 | End: 2019-01-19

## 2019-01-19 RX ORDER — ERTAPENEM SODIUM 1 G/1
1000 INJECTION, POWDER, LYOPHILIZED, FOR SOLUTION INTRAMUSCULAR; INTRAVENOUS ONCE
Qty: 0 | Refills: 0 | Status: DISCONTINUED | OUTPATIENT
Start: 2019-01-19 | End: 2019-01-20

## 2019-01-19 RX ORDER — METOCLOPRAMIDE HCL 10 MG
10 TABLET ORAL ONCE
Qty: 0 | Refills: 0 | Status: COMPLETED | OUTPATIENT
Start: 2019-01-19 | End: 2019-01-19

## 2019-01-19 RX ORDER — SIMETHICONE 80 MG/1
80 TABLET, CHEWABLE ORAL EVERY 4 HOURS
Qty: 0 | Refills: 0 | Status: DISCONTINUED | OUTPATIENT
Start: 2019-01-20 | End: 2019-01-22

## 2019-01-19 RX ORDER — OXYTOCIN 10 UNIT/ML
41.67 VIAL (ML) INJECTION
Qty: 20 | Refills: 0 | Status: DISCONTINUED | OUTPATIENT
Start: 2019-01-19 | End: 2019-01-20

## 2019-01-19 RX ORDER — TRANEXAMIC ACID 100 MG/ML
1000 INJECTION, SOLUTION INTRAVENOUS ONCE
Qty: 0 | Refills: 0 | Status: COMPLETED | OUTPATIENT
Start: 2019-01-19 | End: 2019-01-19

## 2019-01-19 RX ORDER — DOCUSATE SODIUM 100 MG
100 CAPSULE ORAL
Qty: 0 | Refills: 0 | Status: DISCONTINUED | OUTPATIENT
Start: 2019-01-20 | End: 2019-01-22

## 2019-01-19 RX ORDER — SODIUM CHLORIDE 9 MG/ML
1000 INJECTION, SOLUTION INTRAVENOUS
Qty: 0 | Refills: 0 | Status: DISCONTINUED | OUTPATIENT
Start: 2019-01-19 | End: 2019-01-20

## 2019-01-19 RX ORDER — ERTAPENEM SODIUM 1 G/1
1000 INJECTION, POWDER, LYOPHILIZED, FOR SOLUTION INTRAMUSCULAR; INTRAVENOUS EVERY 24 HOURS
Qty: 0 | Refills: 0 | Status: DISCONTINUED | OUTPATIENT
Start: 2019-01-20 | End: 2019-01-20

## 2019-01-19 RX ORDER — IBUPROFEN 200 MG
600 TABLET ORAL EVERY 6 HOURS
Qty: 0 | Refills: 0 | Status: DISCONTINUED | OUTPATIENT
Start: 2019-01-20 | End: 2019-01-22

## 2019-01-19 RX ORDER — FERROUS SULFATE 325(65) MG
325 TABLET ORAL DAILY
Qty: 0 | Refills: 0 | Status: DISCONTINUED | OUTPATIENT
Start: 2019-01-20 | End: 2019-01-22

## 2019-01-19 RX ORDER — HEPARIN SODIUM 5000 [USP'U]/ML
5000 INJECTION INTRAVENOUS; SUBCUTANEOUS EVERY 12 HOURS
Qty: 0 | Refills: 0 | Status: DISCONTINUED | OUTPATIENT
Start: 2019-01-19 | End: 2019-01-19

## 2019-01-19 RX ORDER — CITRIC ACID/SODIUM CITRATE 300-500 MG
30 SOLUTION, ORAL ORAL ONCE
Qty: 0 | Refills: 0 | Status: COMPLETED | OUTPATIENT
Start: 2019-01-19 | End: 2019-01-19

## 2019-01-19 RX ORDER — DIPHENHYDRAMINE HCL 50 MG
25 CAPSULE ORAL EVERY 6 HOURS
Qty: 0 | Refills: 0 | Status: DISCONTINUED | OUTPATIENT
Start: 2019-01-20 | End: 2019-01-22

## 2019-01-19 RX ORDER — ERTAPENEM SODIUM 1 G/1
INJECTION, POWDER, LYOPHILIZED, FOR SOLUTION INTRAMUSCULAR; INTRAVENOUS
Qty: 0 | Refills: 0 | Status: DISCONTINUED | OUTPATIENT
Start: 2019-01-19 | End: 2019-01-20

## 2019-01-19 RX ORDER — ACETAMINOPHEN 500 MG
1000 TABLET ORAL ONCE
Qty: 0 | Refills: 0 | Status: COMPLETED | OUTPATIENT
Start: 2019-01-19 | End: 2019-01-19

## 2019-01-19 RX ORDER — MAGNESIUM SULFATE 500 MG/ML
2 VIAL (ML) INJECTION
Qty: 40 | Refills: 0 | Status: DISCONTINUED | OUTPATIENT
Start: 2019-01-19 | End: 2019-01-19

## 2019-01-19 RX ORDER — ONDANSETRON 8 MG/1
4 TABLET, FILM COATED ORAL ONCE
Qty: 0 | Refills: 0 | Status: DISCONTINUED | OUTPATIENT
Start: 2019-01-19 | End: 2019-01-19

## 2019-01-19 RX ORDER — MAGNESIUM SULFATE 500 MG/ML
4 VIAL (ML) INJECTION ONCE
Qty: 0 | Refills: 0 | Status: COMPLETED | OUTPATIENT
Start: 2019-01-19 | End: 2019-01-19

## 2019-01-19 RX ORDER — GLYCERIN ADULT
1 SUPPOSITORY, RECTAL RECTAL AT BEDTIME
Qty: 0 | Refills: 0 | Status: DISCONTINUED | OUTPATIENT
Start: 2019-01-20 | End: 2019-01-22

## 2019-01-19 RX ORDER — HYDROMORPHONE HYDROCHLORIDE 2 MG/ML
0.5 INJECTION INTRAMUSCULAR; INTRAVENOUS; SUBCUTANEOUS
Qty: 0 | Refills: 0 | Status: DISCONTINUED | OUTPATIENT
Start: 2019-01-19 | End: 2019-01-19

## 2019-01-19 RX ORDER — TETANUS TOXOID, REDUCED DIPHTHERIA TOXOID AND ACELLULAR PERTUSSIS VACCINE, ADSORBED 5; 2.5; 8; 8; 2.5 [IU]/.5ML; [IU]/.5ML; UG/.5ML; UG/.5ML; UG/.5ML
0.5 SUSPENSION INTRAMUSCULAR ONCE
Qty: 0 | Refills: 0 | Status: DISCONTINUED | OUTPATIENT
Start: 2019-01-20 | End: 2019-01-22

## 2019-01-19 RX ORDER — OXYTOCIN 10 UNIT/ML
16.67 VIAL (ML) INJECTION
Qty: 20 | Refills: 0 | Status: DISCONTINUED | OUTPATIENT
Start: 2019-01-19 | End: 2019-01-20

## 2019-01-19 RX ADMIN — FAMOTIDINE 20 MILLIGRAM(S): 10 INJECTION INTRAVENOUS at 11:26

## 2019-01-19 RX ADMIN — Medication 300 GRAM(S): at 07:56

## 2019-01-19 RX ADMIN — SODIUM CHLORIDE 50 MILLILITER(S): 9 INJECTION, SOLUTION INTRAVENOUS at 23:44

## 2019-01-19 RX ADMIN — Medication 50 GM/HR: at 16:45

## 2019-01-19 RX ADMIN — Medication 20 MILLIGRAM(S): at 07:42

## 2019-01-19 RX ADMIN — Medication 50 GM/HR: at 08:29

## 2019-01-19 RX ADMIN — Medication 50 MILLIUNIT(S)/MIN: at 19:00

## 2019-01-19 RX ADMIN — HYDROMORPHONE HYDROCHLORIDE 0.5 MILLIGRAM(S): 2 INJECTION INTRAMUSCULAR; INTRAVENOUS; SUBCUTANEOUS at 16:50

## 2019-01-19 RX ADMIN — Medication 10 MILLIGRAM(S): at 11:24

## 2019-01-19 RX ADMIN — Medication 1000 MILLIGRAM(S): at 22:00

## 2019-01-19 RX ADMIN — HYDROMORPHONE HYDROCHLORIDE 0.5 MILLIGRAM(S): 2 INJECTION INTRAMUSCULAR; INTRAVENOUS; SUBCUTANEOUS at 15:05

## 2019-01-19 RX ADMIN — Medication 400 MILLIGRAM(S): at 21:28

## 2019-01-19 RX ADMIN — Medication 50 GM/HR: at 19:00

## 2019-01-19 RX ADMIN — Medication 30 MILLILITER(S): at 11:59

## 2019-01-19 RX ADMIN — TRANEXAMIC ACID 220 MILLIGRAM(S): 100 INJECTION, SOLUTION INTRAVENOUS at 17:15

## 2019-01-19 RX ADMIN — Medication 50 MILLIUNIT(S)/MIN: at 16:44

## 2019-01-19 NOTE — CONSULT NOTE ADULT - ASSESSMENT
A/P: MASHA GAUTHIER is a 38y Female  h/o preeclampsia, anemia (not on iron) POD 0 Csection - SICU consulted for hypotension (SBP 80s) 2/2 vaginal hemorrhage [id number] with several seconds of unresponsiveness with subsequent return to consciousness (prior to any intervention). Pt now with improving hemodynamics s/p 2U pRBC, currently on 3rd unit pRBC. Pt mentating well with exam significant for firm uterine fundus and significant control of vaginal bleeding.    NEURO:  - mentating well  - no acute issues  - continue Mg for prevention of seizures from preeclampsia    CV:  - preeclampsia  - continue Mg and strict BP control  - SBP goal < 160  - continue to monitor for vaginal hemorrhage  - currently stable s/p 3U pRBC, will receive 4th unit pRBC    RESP:  - no acute issues    :  - POD 0 Csection with large amounts of vaginal hemorrhage  - Bakri balloon (filled with 300cc saline) with vaginal packing in place; will continue to closely monitor  - OB to be notified if > 50cc blood from Bakri balloon  - osman in place, strict I/O monitoring  - maintenance IVFs in setting of NPO    GI:  - NPO    Heme:  - h/o iron deficient anemia (not on treatment)  - s/p 2u prbc on  for iron deficient anemia  - s/p 3u prbc on  for hemorrhage, now receiving 4th unit of blood  - rpt H/H after 4th unit prbc    ID:  - continue ertapenem per OB for intrauterine packing    Dispo: SICU

## 2019-01-19 NOTE — PROVIDER CONTACT NOTE (CRITICAL VALUE NOTIFICATION) - ACTION/TREATMENT ORDERED:
MD Gandhi to assess patient. Iron given at 6am.
Will consult with team and notify RN with action.
Repeat CBC as per MD Patel.
awaiting orders for tx
repeat CBC at appropriate time

## 2019-01-19 NOTE — PROVIDER CONTACT NOTE (CRITICAL VALUE NOTIFICATION) - SITUATION
Critical Lab value HGB 6.2/HCT 21.9 reported after repeat CBC
Hemoglobin 6.6 and Hematocrit 24.0
Critical Value reported from lab HGB 6.1 HCT 21.8
H/H 6.6/20 - CBC drawn during 2nd unit of PRBCs
H/H 6.8 / 25.1

## 2019-01-19 NOTE — PROVIDER CONTACT NOTE (CRITICAL VALUE NOTIFICATION) - ASSESSMENT
Patient complaining of headache.
Pt asymptomatic. Reports hx of anemia w/pregnancy
Pt asymptomatic. Denies dizziness, SOB, chest pain. Pt resting comfortably in bed. Will continue to assess.
VS stable.  no c/o of any dizziness. No sob.
pt offering no complaints at present

## 2019-01-19 NOTE — PROVIDER CONTACT NOTE (CHANGE IN STATUS NOTIFICATION) - ASSESSMENT
pt hypotensive, normal HR, large EBL with clots, had period of unresponsiveness x approx 5-10 seconds

## 2019-01-19 NOTE — PACU DISCHARGE NOTE - COMMENTS
Called to bedside because patient "passed out". Rapid response was called.  BP 90s/50s, HR-80s, copious vaginal bleeding.  Pt looking pale.  Hung Lactated Ringers wide open until blood arrived to give.  Patient awoke after sniffing alcohol prep pad.  TXA 1 gm, carboprost, and cytotec were all given.  When BP stabilized, pitocin infusion increased.   Lab work drawn. Patient infused 2 units pRBCs rapidly. SICU ordered 2 more pRBCs to be given due to an estimated 2,600 mL of EBL with a starting HCT=29 (from this morning before the C/S).  Patient transferred to SICU for closer monitoring.

## 2019-01-19 NOTE — CONSULT NOTE ADULT - SUBJECTIVE AND OBJECTIVE BOX
HISTORY OF PRESENT ILLNESS:  MASHA GAUTHIER is a 38y Female  h/o preeclampsia, anemia (not on iron) POD 0 Csection - SICU consulted for hypotension (SBP 80s) 2/2 vaginal hemorrhage [id number] with several seconds of unresponsiveness with subsequent return to consciousness (prior to any intervention). Pt resuscitated with IVFs, 2U pRBC, pitocin, txa, carboprost, cytotec, ertabpenem, vaginal packing, Barkri uterine balloon placed under sono by OB. Pt had been admitted to OB 5d prior for preeclampsia and severe anemia Hgb 6.8 requiring 2U pRBC yesterday with rpt Hgb at 8.6/29.6. Pt was being treated with Mg and given one push of IV labetolol 12hrs ago for preeclampsia. During Csection, had 600cc EBL. Pt transferred to SICU for further hemodynamic monitoring.    PAST MEDICAL HISTORY: severe anemia      PAST SURGICAL HISTORY: H/O:       FAMILY HISTORY:     SOCIAL HISTORY:    CODE STATUS:     HOME MEDICATIONS:    ALLERGIES: No Known Allergies      VITAL SIGNS:  ICU Vital Signs Last 24 Hrs  T(C): 35.9 (2019 19:00), Max: 37 (2019 00:17)  T(F): 96.7 (2019 19:00), Max: 98.6 (2019 00:17)  HR: 73 (2019 20:00) (68 - 92)  BP: 152/100 (2019 20:00) (73/50 - 186/93)  BP(mean): 111 (2019 20:00) (51 - 111)  ABP: --  ABP(mean): --  RR: 20 (2019 20:00) (16 - 26)  SpO2: 99% (2019 20:00) (98% - 100%)      NEURO  Exam: alert and oriented, responding to commands appropriately  magnesium sulfate Infusion 2 Gm/Hr IV Continuous <Continuous>      RESPIRATORY  ABG - ( 2019 18:05 )  pH: x     /  pCO2: x     /  pO2: x     / HCO3: x     / Base Excess: x     /  SaO2: x       Lactate: 5.2    Exam: CTAB      CARDIOVASCULAR  Exam: RRR, nl S1/S2, no mrg      GI/NUTRITION  Exam: soft, uterine fundus firm, palpated 2inches above umbilicus, Bakri balloon with packing in place, csecion site with dressing c/d/i  Diet: NPO  misoprostol 200 MICROGram(s) Oral every 4 hours      GENITOURINARY/RENAL  lactated ringers. 1000 milliLiter(s) IV Continuous <Continuous>  oxytocin Infusion 41.667 milliUNIT(s)/Min IV Continuous <Continuous>  oxytocin Infusion 16.667 milliUNIT(s)/Min IV Continuous <Continuous>       @ 07:  -   @ 07:00  --------------------------------------------------------  IN:  Total IN: 0 mL    OUT:    Voided: 2625 mL  Total OUT: 2625 mL    Total NET: -2625 mL       @ 07: @ 20:20  --------------------------------------------------------  IN:    magnesium sulfate  Infusion: 450 mL    Other: 3500 mL    oxytocin Infusion: 1150 mL    Packed Red Blood Cells: 884 mL    Solution: 300 mL  Total IN: 6284 mL    OUT:    Estimated Blood Loss: 2600 mL    Indwelling Catheter - Urethral: 1305 mL    Voided: 900 mL  Total OUT: 4805 mL    Total NET: 1479 mL        Weight (kg): 82.554 ( @ 21:03)      136  |  107  |  9   ----------------------------<  189<H>  4.7   |  17<L>  |  0.91    Ca    7.3<L>      2019 18:05    TPro  3.9<L>  /  Alb  2.2<L>  /  TBili  0.4  /  DBili  x   /  AST  37<H>  /  ALT  11  /  AlkPhos  121<H>      [x ] Haskins catheter, indication: urine output monitoring in critically ill patient    HEMATOLOGIC  [ ] VTE Prophylaxis:                          7.3    24.80 )-----------( 237      ( 2019 17:50 )             24.7     PT/INR - ( 2019 17:24 )   PT: 11.5 SEC;   INR: 1.01          PTT - ( 2019 17:24 )  PTT:23.5 SEC  Transfusion: [ ] PRBC	[ ] Platelets	[ ] FFP	[ ] Cryoprecipitate      INFECTIOUS DISEASES  ertapenem  IVPB      ertapenem  IVPB 1000 milliGRAM(s) IV Intermittent once    RECENT CULTURES:      ENDOCRINE    CAPILLARY BLOOD GLUCOSE          PATIENT CARE ACCESS DEVICES:  [x2 ] Peripheral IV  [ ] Central Venous Line	[ ] R	[ ] L	[ ] IJ	[ ] Fem	[ ] SC	Placed:   [ ] Arterial Line		[ ] R	[ ] L	[ ] Fem	[ ] Rad	[ ] Ax	Placed:   [ ] PICC:					[ ] Mediport  [ ] Urinary Catheter, Date Placed:   [x] Necessity of urinary, arterial, and venous catheters discussed    OTHER MEDICATIONS:     IMAGING STUDIES:

## 2019-01-19 NOTE — CHART NOTE - NSCHARTNOTEFT_GEN_A_CORE
~~~Surgical Rapid Response, Resident Note~~~     Rapid response called for hypotensive patient with postpartum hemorrhage. Patient POD#0 s/p repeat LTCS for severe PEC, on magnesium and with anemia. Patient was hypotensive to SBP 80s, and was transiently unresponsive. Patient was alert and communicative upon arrival of RRT, and was undergoing placement of Bakri balloon and vaginal packing by ObGyn team. Patient's blood pressure improved, to SBP 140s, in the setting of PEC.    Rapid response team obtained additional IV access and sent labs. Two units PRBC were transfused. Patient had estimated 1.8L postpartum blood loss, with Bakri position confirmed with sono. SICU and ObGyn team discussed need for SICU care, but will defer transfer to allow for reassessment of Bakri efficacy vs need for return to OR.    - continue transfusion  - consider holding Mg in setting of bleed, concern for uterine atony and hypotension  - will plan for SICU transfer pending OR vs continued hemostasis with intrauterine balloon    Seen and examined with Dr. Tomlinson, Dr. Godoy, Dr. Ames and ObGyn team.  --FORREST Cochran, PGY-4

## 2019-01-19 NOTE — CHART NOTE - NSCHARTNOTEFT_GEN_A_CORE
R4 S[facility] Surgical Rapid Response    Presented to patient bedside for surgical rapid response. Patient found to be pale, unresponsive and hypotensive by nursing in setting of active vaginal bleeding.     Vital Signs Last 24 Hrs  T(C): 36.4 (19 Jan 2019 15:06), Max: 37 (18 Jan 2019 18:07)  T(F): 97.5 (19 Jan 2019 15:06), Max: 98.6 (18 Jan 2019 18:07)  HR: 86 (19 Jan 2019 17:00) (68 - 92)  BP: 150/91 (19 Jan 2019 16:45) (117/80 - 186/93)  RR: 16 (19 Jan 2019 17:00) (16 - 21)  SpO2: 100% (19 Jan 2019 17:00) (98% - 100%)    Gen: patient initially pale and unresponsive, responded to smelling salt/sternal rub  VE: cervix 3cm dilated, copious clot extracted, Bakri placed under sonogram guidance, filled with 300ml saline, 2cm clot noted at fundus above the Bakri      LABS:                        8.6    21.42 )-----------( 222      ( 19 Jan 2019 06:00 )             29.6     01-19    138  |  108<H>  |  9   ----------------------------<  85  4.0   |  18<L>  |  0.86    Ca    8.6      19 Jan 2019 06:00    TPro  5.6<L>  /  Alb  2.8<L>  /  TBili  0.4  /  DBili  x   /  AST  40<H>  /  ALT  14  /  AlkPhos  160<H>  01-19    PT/INR - ( 19 Jan 2019 06:00 )   PT: 10.9 SEC;   INR: 0.96     PTT - ( 19 Jan 2019 06:00 )  PTT:23.2 SEC    STAT Labs pending     A/P: 39 y/o P3 POD#0 s/p rLTCS at 36w2d for sPEC/Mg EBL 600ml now with PPH 2L due to atony  -TXA, cytotec 1000mcg per rectum given, will continue on 200mcg q4 hours x24 hours  -continue magnesium sulfate for seizure and stroke prophylaxis at this time  -Bakri balloon in place, will monitor output x1 hour and reassess patient status with ICU team  -Invanz for infection prophylaxis while Bakri in place  -Labs sent: CBC, CMP, lactate  -2u prbc hung, patient previously anemic s/p 2uprbc this AM pre-op  -patient to remain in PACU at this time, re-eval in 1 hour for dispo plan, SICU vs continued PACU    N Sample, PGY4  SICU Team, Dr. Wilcox of anesthesia, Dr. Godoy and Dr. Ames of OB present for rapid response  Dr. Schulte aware of above events R4 [facility] Surgical Rapid Response    Presented to patient bedside for surgical rapid response. Patient found to be pale, unresponsive and hypotensive by nursing in setting of active vaginal bleeding.     Vital Signs Last 24 Hrs  T(C): 36.4 (19 Jan 2019 15:06), Max: 37 (18 Jan 2019 18:07)  T(F): 97.5 (19 Jan 2019 15:06), Max: 98.6 (18 Jan 2019 18:07)  HR: 86 (19 Jan 2019 17:00) (68 - 92)  BP: 150/91 (19 Jan 2019 16:45) (117/80 - 186/93)  RR: 16 (19 Jan 2019 17:00) (16 - 21)  SpO2: 100% (19 Jan 2019 17:00) (98% - 100%)    Gen: patient initially pale and unresponsive, responded to smelling salt/sternal rub  VE: cervix 3cm dilated, copious clot extracted, Bakri placed under sonogram guidance, filled with 300ml saline, 2cm clot noted at fundus above the Bakri      LABS:                        8.6    21.42 )-----------( 222      ( 19 Jan 2019 06:00 )             29.6     01-19    138  |  108<H>  |  9   ----------------------------<  85  4.0   |  18<L>  |  0.86    Ca    8.6      19 Jan 2019 06:00    TPro  5.6<L>  /  Alb  2.8<L>  /  TBili  0.4  /  DBili  x   /  AST  40<H>  /  ALT  14  /  AlkPhos  160<H>  01-19    PT/INR - ( 19 Jan 2019 06:00 )   PT: 10.9 SEC;   INR: 0.96     PTT - ( 19 Jan 2019 06:00 )  PTT:23.2 SEC    STAT Labs pending     A/P: 39 y/o P3 POD#0 s/p rLTCS at 36w2d for sPEC/Mg EBL 600ml now with PPH 2L due to atony  -TXA, cytotec 1000mcg per rectum given, will continue on 200mcg q4 hours x24 hours  -continue magnesium sulfate for seizure and stroke prophylaxis at this time  -Bakri balloon in place, will monitor output x1 hour and reassess patient status with ICU team  -Invanz for infection prophylaxis while Bakri in place  -Labs sent: CBC, CMP, lactate  -2u prbc hung, patient previously anemic s/p 2uprbc this AM pre-op  -patient to remain in PACU at this time, re-eval in 1 hour for dispo plan, SICU vs continued PACU    N Sample, PGY4  SICU Team, Dr. Wilcox of anesthesia, Dr. Godoy and Dr. Ames of OB present for rapid response  Dr. Schulte aware of above events      Service attending=    I was present for all above and agree with above description    awaiting new labs     pt found to be unresponsive and pale as stated    vitals as above  Bakri rapidly placed with sono with the addition of 1g TXA, carboprost 250mcg IM, 40U pitocin, cytotec 1000mcg UT, =with radha responsive   blood transfusion started as was prior anemic and EBL 2L  labs sent and pending  pt now responsive  SICU aware plan made with them-- will make bed available if needed  Dr. Schulte en route  spoke with family-- if bleeding continues may need to proceed to OR for repeat laparotomy [facility] Surgical Rapid Response    Presented to patient bedside for surgical rapid response. Patient found to be pale, unresponsive and hypotensive by nursing in setting of active vaginal bleeding.     Vital Signs Last 24 Hrs  T(C): 36.4 (19 Jan 2019 15:06), Max: 37 (18 Jan 2019 18:07)  T(F): 97.5 (19 Jan 2019 15:06), Max: 98.6 (18 Jan 2019 18:07)  HR: 86 (19 Jan 2019 17:00) (68 - 92)  BP: 150/91 (19 Jan 2019 16:45) (117/80 - 186/93)  RR: 16 (19 Jan 2019 17:00) (16 - 21)  SpO2: 100% (19 Jan 2019 17:00) (98% - 100%)    Gen: patient initially pale and unresponsive, responded to smelling salt/sternal rub  VE: cervix 3cm dilated, copious clot extracted, Bakri placed under sonogram guidance, filled with 300ml saline, 2cm clot noted at fundus above the Bakri      LABS:                        8.6    21.42 )-----------( 222      ( 19 Jan 2019 06:00 )             29.6     01-19    138  |  108<H>  |  9   ----------------------------<  85  4.0   |  18<L>  |  0.86    Ca    8.6      19 Jan 2019 06:00    TPro  5.6<L>  /  Alb  2.8<L>  /  TBili  0.4  /  DBili  x   /  AST  40<H>  /  ALT  14  /  AlkPhos  160<H>  01-19    PT/INR - ( 19 Jan 2019 06:00 )   PT: 10.9 SEC;   INR: 0.96     PTT - ( 19 Jan 2019 06:00 )  PTT:23.2 SEC    STAT Labs pending     A/P: 39 y/o P3 POD#0 s/p rLTCS at 36w2d for sPEC/Mg EBL 600ml now with PPH 2L due to atony  -TXA, cytotec 1000mcg per rectum, hemabate IM and 40u pitocin given, will continue on 200mcg q4 hours x24 hours  -continue magnesium sulfate for seizure and stroke prophylaxis at this time  -Bakri balloon in place, will monitor output x1 hour and reassess patient status with ICU team   -Invanz for infection prophylaxis while Bakri in place  -Labs sent: CBC, CMP, lactate  -2u prbc hung, patient previously anemic s/p 2uprbc this AM pre-op  -patient to remain in PACU at this time, re-eval in 1 hour for dispo plan, SICU vs OR vs continued PACU    N Sample, PGY4  SICU Team, Dr. Wilcox of anesthesia, Dr. Godoy and Dr. Ames of OB present for rapid response  Dr. Schulte aware of above events      Service attending=    I was present for all above and agree with above description    awaiting new labs     pt found to be unresponsive and pale as stated    vitals as above  Bakri rapidly placed with sono with the addition of 1g TXA, carboprost 250mcg IM, 40U pitocin, cytotec 1000mcg MS, =with radha responsive   blood transfusion started as was prior anemic and EBL 2L  labs sent and pending  pt now responsive  SICU aware plan made with them-- will make bed available if needed  Dr. Schulte en route  spoke with family-- if bleeding continues may need to proceed to OR for repeat laparotomy

## 2019-01-19 NOTE — PROVIDER CONTACT NOTE (CHANGE IN STATUS NOTIFICATION) - ACTION/TREATMENT ORDERED:
bakri balloon and vaginal packing placed; multiple medications and blood products administered, see EMAR and SRRT notes   pt transferrd to SICU with SICU/OB team and monitored bed

## 2019-01-19 NOTE — CONSULT NOTE ADULT - ATTENDING COMMENTS
The patient is a critical care patient with hemodynamic and metabolic instability in SICU.  I have personally interviewed and examined this patient, reviewed labs and x-rays, discussed with other consultants, House staff and PA's.  I spent  35   minutes  in total providing critical care for the diagnoses, assessment and plan above.  These diagnoses are unrelated to the surgical procedure noted above.  Time involved in performance of separately billable procedures was not counted toward my critical care time.  There is no overlap.    Current Issues:  O72.1 Postpartum hemorrhage, unspecified type   - bakri balloon in place, monitoring output.  Serial H/H, serial abd exams  D62 Acute posthemorrhagic anemia   - as above  I95.9 Hypotension, unspecified hypotension type   - improved.  Continuing close HD monitoring in the SICU

## 2019-01-19 NOTE — PROVIDER CONTACT NOTE (CRITICAL VALUE NOTIFICATION) - BACKGROUND
36 weeks and 1 day pregnant with a history of anemia with this pregnancy.
See previous note
30.1wks
35.6 week Hypertensive Antepartum s/p beta and mag
36.1 IUP admitted with PEC and low H/H.  pt scheduled for r c/s on 2/12

## 2019-01-19 NOTE — PROGRESS NOTE ADULT - SUBJECTIVE AND OBJECTIVE BOX
Patient is 38y  old.  patient was seen and examined at the bedside at  5;55 pm , Resident's note reviewed  Event : patient became unresponsive for a few moments  and hypotensive, found to have vaginal bleeding at least 1000 cc estimated blood loss. Rapid response called.  patient was resuscitated fluids and Pitocin, TXA, carboprost, cytotec  and uterine baboon was placed under sono guidance with evaluation of additional blood clots. to treat uterine atony. With good response to treatment.  presently patient  in bed, responsive , reports feeling tired, no chest pain or SOB, or palpitations     Vital Signs Last 24 Hrs  T(C): 36.4 (19 Jan 2019 15:06), Max: 37 (19 Jan 2019 00:17)  T(F): 97.5 (19 Jan 2019 15:06), Max: 98.6 (19 Jan 2019 00:17)  HR: 86 (19 Jan 2019 17:00) (68 - 92)  BP: 150/91 (19 Jan 2019 16:45) (117/80 - 186/93)  BP(mean): 105 (19 Jan 2019 16:45) (103 - 108)  RR: 16 (19 Jan 2019 17:00) (16 - 21)  SpO2: 100% (19 Jan 2019 17:00) (98% - 100%)    I&O's Detail    18 Jan 2019 07:01  -  19 Jan 2019 07:00  --------------------------------------------------------  IN:  Total IN: 0 mL    OUT:    Voided: 2625 mL  Total OUT: 2625 mL    Total NET: -2625 mL      19 Jan 2019 07:01  -  19 Jan 2019 18:13  --------------------------------------------------------  IN:    magnesium sulfate  Infusion: 300 mL    Other: 3500 mL    oxytocin Infusion: 50 mL    Solution: 300 mL  Total IN: 4150 mL    OUT:    Estimated Blood Loss: 600 mL    Indwelling Catheter - Urethral: 1305 mL    Voided: 900 mL  Total OUT: 2805 mL    Total NET: 1345 mL    Gen: patient is A&Ox3, comfortable  Card: S1S2 RRR  Lugs; CTA b/l  Abdomen: soft, NT, FF  Incision: dsg CDI  Extr; warm,NT      Labs:                        8.6    21.42 )-----------( 222      ( 19 Jan 2019 06:00 )             29.6                         6.8    21.78 )-----------( 260      ( 18 Jan 2019 17:25 )             25.1                         6.6    20.96 )-----------( 265      ( 18 Jan 2019 06:49 )             24.0         PT/INR - ( 19 Jan 2019 06:00 )   PT: 10.9 SEC;   INR: 0.96          PTT - ( 19 Jan 2019 06:00 )  PTT:23.2 SEC    Fibrinogen: Fibrinogen Assay: 479.0 mg/dL (01-19 @ 06:00)      Lactate:     MEDICATIONS  (STANDING):  ertapenem  IVPB      ertapenem  IVPB 1000 milliGRAM(s) IV Intermittent once  magnesium sulfate Infusion 2 Gm/Hr (50 mL/Hr) IV Continuous <Continuous>  misoprostol 1000 MICROGram(s) Rectal once  misoprostol 200 MICROGram(s) Oral every 4 hours  oxytocin Infusion 41.667 milliUNIT(s)/Min (125 mL/Hr) IV Continuous <Continuous>  oxytocin Infusion 16.667 milliUNIT(s)/Min (50 mL/Hr) IV Continuous <Continuous>  tranexamic acid IVPB 1000 milliGRAM(s) IV Intermittent once      Evaluation :    Differential Dg :delayed postpartum hemorrhage due to uterine atony.    Management and Follow-up plan : SICU consult appreciated, patient is on Mg for severe preeclampsia, s/p hemostatic meds and Bakry balloon.  Presently no active bleeding noted vaginally, will monitor balloon output, continue close monitoring of I&O and vitals per protocol  Labs sent and pending, T&S ccurrent. Patient transfused 2 U of PRBC, plant to transfuse 2 more unites.  Patient to be transferred to ICU.  TBL 2000cc.                -------------------------------------------------------------------------------------------------------------

## 2019-01-20 LAB
ALBUMIN SERPL ELPH-MCNC: 2.4 G/DL — LOW (ref 3.3–5)
ALP SERPL-CCNC: 127 U/L — HIGH (ref 40–120)
ALT FLD-CCNC: 14 U/L — SIGNIFICANT CHANGE UP (ref 4–33)
ANION GAP SERPL CALC-SCNC: 14 MMO/L — SIGNIFICANT CHANGE UP (ref 7–14)
ANION GAP SERPL CALC-SCNC: 15 MMO/L — HIGH (ref 7–14)
AST SERPL-CCNC: 47 U/L — HIGH (ref 4–32)
BASOPHILS # BLD AUTO: 0.06 K/UL — SIGNIFICANT CHANGE UP (ref 0–0.2)
BASOPHILS NFR BLD AUTO: 0.2 % — SIGNIFICANT CHANGE UP (ref 0–2)
BILIRUB SERPL-MCNC: 0.8 MG/DL — SIGNIFICANT CHANGE UP (ref 0.2–1.2)
BUN SERPL-MCNC: 12 MG/DL — SIGNIFICANT CHANGE UP (ref 7–23)
BUN SERPL-MCNC: 13 MG/DL — SIGNIFICANT CHANGE UP (ref 7–23)
CALCIUM SERPL-MCNC: 7 MG/DL — LOW (ref 8.4–10.5)
CALCIUM SERPL-MCNC: 7.4 MG/DL — LOW (ref 8.4–10.5)
CHLORIDE SERPL-SCNC: 100 MMOL/L — SIGNIFICANT CHANGE UP (ref 98–107)
CHLORIDE SERPL-SCNC: 102 MMOL/L — SIGNIFICANT CHANGE UP (ref 98–107)
CO2 SERPL-SCNC: 17 MMOL/L — LOW (ref 22–31)
CO2 SERPL-SCNC: 18 MMOL/L — LOW (ref 22–31)
CREAT SERPL-MCNC: 0.96 MG/DL — SIGNIFICANT CHANGE UP (ref 0.5–1.3)
CREAT SERPL-MCNC: 1.05 MG/DL — SIGNIFICANT CHANGE UP (ref 0.5–1.3)
EOSINOPHIL # BLD AUTO: 0 K/UL — SIGNIFICANT CHANGE UP (ref 0–0.5)
EOSINOPHIL NFR BLD AUTO: 0 % — SIGNIFICANT CHANGE UP (ref 0–6)
GLUCOSE SERPL-MCNC: 103 MG/DL — HIGH (ref 70–99)
GLUCOSE SERPL-MCNC: 90 MG/DL — SIGNIFICANT CHANGE UP (ref 70–99)
HCT VFR BLD CALC: 36.8 % — SIGNIFICANT CHANGE UP (ref 34.5–45)
HCT VFR BLD CALC: 39.4 % — SIGNIFICANT CHANGE UP (ref 34.5–45)
HGB BLD-MCNC: 12.6 G/DL — SIGNIFICANT CHANGE UP (ref 11.5–15.5)
HGB BLD-MCNC: 13.4 G/DL — SIGNIFICANT CHANGE UP (ref 11.5–15.5)
IMM GRANULOCYTES NFR BLD AUTO: 3.4 % — HIGH (ref 0–1.5)
LACTATE SERPL-SCNC: 2.9 MMOL/L — HIGH (ref 0.5–2)
LDH SERPL L TO P-CCNC: 970 U/L — HIGH (ref 135–225)
LYMPHOCYTES # BLD AUTO: 1.78 K/UL — SIGNIFICANT CHANGE UP (ref 1–3.3)
LYMPHOCYTES # BLD AUTO: 6.1 % — LOW (ref 13–44)
MAGNESIUM SERPL-MCNC: 5.8 MG/DL — HIGH (ref 1.6–2.6)
MAGNESIUM SERPL-MCNC: 6.8 MG/DL — HIGH (ref 1.6–2.6)
MCHC RBC-ENTMCNC: 26.5 PG — LOW (ref 27–34)
MCHC RBC-ENTMCNC: 26.7 PG — LOW (ref 27–34)
MCHC RBC-ENTMCNC: 34 % — SIGNIFICANT CHANGE UP (ref 32–36)
MCHC RBC-ENTMCNC: 34.2 % — SIGNIFICANT CHANGE UP (ref 32–36)
MCV RBC AUTO: 77.5 FL — LOW (ref 80–100)
MCV RBC AUTO: 78.5 FL — LOW (ref 80–100)
MONOCYTES # BLD AUTO: 1.8 K/UL — HIGH (ref 0–0.9)
MONOCYTES NFR BLD AUTO: 6.2 % — SIGNIFICANT CHANGE UP (ref 2–14)
NEUTROPHILS # BLD AUTO: 24.52 K/UL — HIGH (ref 1.8–7.4)
NEUTROPHILS NFR BLD AUTO: 84.1 % — HIGH (ref 43–77)
NRBC # FLD: 0.46 K/UL — LOW (ref 25–125)
NRBC # FLD: 0.83 K/UL — LOW (ref 25–125)
NRBC FLD-RTO: 1.7 — SIGNIFICANT CHANGE UP
NRBC FLD-RTO: 2.8 — SIGNIFICANT CHANGE UP
PHOSPHATE SERPL-MCNC: 5.2 MG/DL — HIGH (ref 2.5–4.5)
PHOSPHATE SERPL-MCNC: 5.6 MG/DL — HIGH (ref 2.5–4.5)
PLATELET # BLD AUTO: 111 K/UL — LOW (ref 150–400)
PLATELET # BLD AUTO: 121 K/UL — LOW (ref 150–400)
PMV BLD: 11 FL — SIGNIFICANT CHANGE UP (ref 7–13)
PMV BLD: SIGNIFICANT CHANGE UP FL (ref 7–13)
POTASSIUM SERPL-MCNC: 4.7 MMOL/L — SIGNIFICANT CHANGE UP (ref 3.5–5.3)
POTASSIUM SERPL-MCNC: 5.4 MMOL/L — HIGH (ref 3.5–5.3)
POTASSIUM SERPL-SCNC: 4.7 MMOL/L — SIGNIFICANT CHANGE UP (ref 3.5–5.3)
POTASSIUM SERPL-SCNC: 5.4 MMOL/L — HIGH (ref 3.5–5.3)
PROT SERPL-MCNC: 4.6 G/DL — LOW (ref 6–8.3)
RBC # BLD: 4.75 M/UL — SIGNIFICANT CHANGE UP (ref 3.8–5.2)
RBC # BLD: 5.02 M/UL — SIGNIFICANT CHANGE UP (ref 3.8–5.2)
RBC # FLD: 21.2 % — HIGH (ref 10.3–14.5)
RBC # FLD: 22 % — HIGH (ref 10.3–14.5)
SODIUM SERPL-SCNC: 132 MMOL/L — LOW (ref 135–145)
SODIUM SERPL-SCNC: 134 MMOL/L — LOW (ref 135–145)
WBC # BLD: 27.03 K/UL — HIGH (ref 3.8–10.5)
WBC # BLD: 29.16 K/UL — HIGH (ref 3.8–10.5)
WBC # FLD AUTO: 27.03 K/UL — HIGH (ref 3.8–10.5)
WBC # FLD AUTO: 29.16 K/UL — HIGH (ref 3.8–10.5)

## 2019-01-20 PROCEDURE — 99233 SBSQ HOSP IP/OBS HIGH 50: CPT | Mod: GC

## 2019-01-20 RX ORDER — SODIUM CHLORIDE 9 MG/ML
1000 INJECTION, SOLUTION INTRAVENOUS
Qty: 0 | Refills: 0 | Status: DISCONTINUED | OUTPATIENT
Start: 2019-01-20 | End: 2019-01-20

## 2019-01-20 RX ORDER — OXYCODONE HYDROCHLORIDE 5 MG/1
5 TABLET ORAL EVERY 4 HOURS
Qty: 0 | Refills: 0 | Status: DISCONTINUED | OUTPATIENT
Start: 2019-01-20 | End: 2019-01-22

## 2019-01-20 RX ORDER — ACETAMINOPHEN 500 MG
975 TABLET ORAL EVERY 6 HOURS
Qty: 0 | Refills: 0 | Status: DISCONTINUED | OUTPATIENT
Start: 2019-01-20 | End: 2019-01-22

## 2019-01-20 RX ORDER — ACETAMINOPHEN 500 MG
975 TABLET ORAL EVERY 6 HOURS
Qty: 0 | Refills: 0 | Status: DISCONTINUED | OUTPATIENT
Start: 2019-01-20 | End: 2019-01-20

## 2019-01-20 RX ORDER — OXYCODONE HYDROCHLORIDE 5 MG/1
5 TABLET ORAL
Qty: 0 | Refills: 0 | Status: DISCONTINUED | OUTPATIENT
Start: 2019-01-20 | End: 2019-01-22

## 2019-01-20 RX ORDER — LABETALOL HCL 100 MG
100 TABLET ORAL EVERY 12 HOURS
Qty: 0 | Refills: 0 | Status: DISCONTINUED | OUTPATIENT
Start: 2019-01-20 | End: 2019-01-22

## 2019-01-20 RX ORDER — HEPARIN SODIUM 5000 [USP'U]/ML
5000 INJECTION INTRAVENOUS; SUBCUTANEOUS EVERY 8 HOURS
Qty: 0 | Refills: 0 | Status: DISCONTINUED | OUTPATIENT
Start: 2019-01-20 | End: 2019-01-22

## 2019-01-20 RX ORDER — KETOROLAC TROMETHAMINE 30 MG/ML
30 SYRINGE (ML) INJECTION EVERY 6 HOURS
Qty: 0 | Refills: 0 | Status: DISCONTINUED | OUTPATIENT
Start: 2019-01-20 | End: 2019-01-20

## 2019-01-20 RX ORDER — OXYCODONE HYDROCHLORIDE 5 MG/1
5 TABLET ORAL EVERY 6 HOURS
Qty: 0 | Refills: 0 | Status: DISCONTINUED | OUTPATIENT
Start: 2019-01-20 | End: 2019-01-20

## 2019-01-20 RX ORDER — LABETALOL HCL 100 MG
10 TABLET ORAL ONCE
Qty: 0 | Refills: 0 | Status: COMPLETED | OUTPATIENT
Start: 2019-01-20 | End: 2019-01-20

## 2019-01-20 RX ORDER — MAGNESIUM SULFATE 500 MG/ML
1.5 VIAL (ML) INJECTION
Qty: 40 | Refills: 0 | Status: DISCONTINUED | OUTPATIENT
Start: 2019-01-20 | End: 2019-01-20

## 2019-01-20 RX ADMIN — OXYCODONE HYDROCHLORIDE 5 MILLIGRAM(S): 5 TABLET ORAL at 20:11

## 2019-01-20 RX ADMIN — Medication 975 MILLIGRAM(S): at 06:39

## 2019-01-20 RX ADMIN — OXYCODONE HYDROCHLORIDE 5 MILLIGRAM(S): 5 TABLET ORAL at 04:15

## 2019-01-20 RX ADMIN — SODIUM CHLORIDE 50 MILLILITER(S): 9 INJECTION, SOLUTION INTRAVENOUS at 19:26

## 2019-01-20 RX ADMIN — ERTAPENEM SODIUM 120 MILLIGRAM(S): 1 INJECTION, POWDER, LYOPHILIZED, FOR SOLUTION INTRAMUSCULAR; INTRAVENOUS at 17:02

## 2019-01-20 RX ADMIN — Medication 10 MILLIGRAM(S): at 08:13

## 2019-01-20 RX ADMIN — HEPARIN SODIUM 5000 UNIT(S): 5000 INJECTION INTRAVENOUS; SUBCUTANEOUS at 13:46

## 2019-01-20 RX ADMIN — OXYCODONE HYDROCHLORIDE 5 MILLIGRAM(S): 5 TABLET ORAL at 20:46

## 2019-01-20 RX ADMIN — SODIUM CHLORIDE 120 MILLILITER(S): 9 INJECTION, SOLUTION INTRAVENOUS at 10:11

## 2019-01-20 RX ADMIN — Medication 975 MILLIGRAM(S): at 18:03

## 2019-01-20 RX ADMIN — Medication 100 MILLIGRAM(S): at 10:09

## 2019-01-20 RX ADMIN — HEPARIN SODIUM 5000 UNIT(S): 5000 INJECTION INTRAVENOUS; SUBCUTANEOUS at 23:01

## 2019-01-20 RX ADMIN — Medication 37.5 GM/HR: at 05:06

## 2019-01-20 RX ADMIN — SODIUM CHLORIDE 50 MILLILITER(S): 9 INJECTION, SOLUTION INTRAVENOUS at 16:49

## 2019-01-20 RX ADMIN — Medication 975 MILLIGRAM(S): at 12:13

## 2019-01-20 RX ADMIN — Medication 975 MILLIGRAM(S): at 06:28

## 2019-01-20 RX ADMIN — OXYCODONE HYDROCHLORIDE 5 MILLIGRAM(S): 5 TABLET ORAL at 03:46

## 2019-01-20 RX ADMIN — Medication 37.5 GM/HR: at 10:12

## 2019-01-20 RX ADMIN — Medication 100 MILLIGRAM(S): at 23:01

## 2019-01-20 NOTE — PROGRESS NOTE ADULT - ATTENDING COMMENTS
Patient was seen and evaluated at bedside
Current Issues:  O72.1 Postpartum hemorrhage, unspecified type   - bakri output minimal.  Plan for removal today by OB/GYN  - HD stable, comfortable  D62 Acute posthemorrhagic anemia   - H/H appear stabilized.    O14.93 Pre-eclampsia in third trimester   - continuing mag drip for 24 hour course.  Maintaining SBP<160, have started PO labetalol
Patient was seen and evaluated at bedside. She is feeling better and passing flatus tolerating diet. Adequate UO. Labs reviewed. VS reviewed   Uterine bleeding controlled. Patient is stable, I agree with the plan outlined above Will evaluate for transfer to the postpartum floor later tonight.

## 2019-01-20 NOTE — PROGRESS NOTE ADULT - ASSESSMENT
38 P3 POD#1 status post rLTCS @ 35w6d for sPEC, postpartum course c/b PPH (600+2L) status post 4uPRBC in total, TXA, cytotec UT, with Bakri balloon in situ. Transferred to SICU for closer hemodynamic monitoring

## 2019-01-20 NOTE — PROGRESS NOTE ADULT - SUBJECTIVE AND OBJECTIVE BOX
SICU Progress Note  =====================================================  Interval/Overnight Events: h/h responded appropriately to transfusion. On mag infusion.  No acute events overnight.     HPI:    MASHA GAUTHIER is a 38y Female  h/o preeclampsia, anemia (not on iron) POD 0 Csection - SICU consulted for hypotension (SBP 80s) 2/2 vaginal hemorrhage [id number] with several seconds of unresponsiveness with subsequent return to consciousness (prior to any intervention). Pt resuscitated with IVFs, 2U pRBC, pitocin, txa, carboprost, cytotec, ertabpenem, vaginal packing, Barkri uterine balloon placed under sono by OB. Pt had been admitted to OB 5d prior for preeclampsia and severe anemia Hgb 6.8 requiring 2U pRBC yesterday with rpt Hgb at 8.6/29.6. Pt was being treated with Mg and given one push of IV labetolol 12hrs ago for preeclampsia. During Csection, had 600cc EBL. Pt transferred to SICU for further hemodynamic monitoring.      HISTORY  38y Female  Allergies:  No Known Allergies    Problems:   Pre-eclampsia  Other pregnancy-related conditions, antepartum  Weeks of gestation of pregnancy not specified  No pertinent past medical history  H/O:       MEDICATIONS:   --------------------------------------------------------------------------------------  Neurologic Medications  magnesium sulfate Infusion 2 Gm/Hr IV Continuous <Continuous>    Respiratory Medications    Cardiovascular Medications    Gastrointestinal Medications  lactated ringers. 1000 milliLiter(s) IV Continuous <Continuous>  misoprostol 200 MICROGram(s) Oral every 4 hours    Genitourinary Medications  oxytocin Infusion 41.667 milliUNIT(s)/Min IV Continuous <Continuous>  oxytocin Infusion 16.667 milliUNIT(s)/Min IV Continuous <Continuous>    Hematologic/Oncologic Medications    Antimicrobial/Immunologic Medications  ertapenem  IVPB      ertapenem  IVPB 1000 milliGRAM(s) IV Intermittent once  ertapenem  IVPB 1000 milliGRAM(s) IV Intermittent every 24 hours    Endocrine/Metabolic Medications    Topical/Other Medications    --------------------------------------------------------------------------------------    VITAL SIGNS, INS/OUTS (last 24 hours):  --------------------------------------------------------------------------------------  T(C): 35.9 (19 @ 19:00), Max: 37 (19 @ 03:35)  HR: 72 (19 23:00) (68 - 89)  BP: 152/92 (19 @ 23:00) (73/50 - 186/93)  ABP: --  ABP(mean): --  RR: 21 (19 @ 23:00) (16 - 26)  SpO2: 96% (19 23:00) (96% - 100%)  Wt(kg): --  CVP(mm Hg): --       @ 07:  -   @ 07:00  --------------------------------------------------------  IN:  Total IN: 0 mL    OUT:    Voided: 2625 mL  Total OUT: 2625 mL    Total NET: -2625 mL       @ 07:01  -   @ 00:25  --------------------------------------------------------  IN:    lactated ringers.: 100 mL    magnesium sulfate  Infusion: 450 mL    magnesium sulfate  Infusion: 200 mL    Other: 3500 mL    oxytocin Infusion: 1300 mL    Packed Red Blood Cells: 1184 mL    Solution: 300 mL  Total IN: 7034 mL    OUT:    Estimated Blood Loss: 2600 mL    Indwelling Catheter - Urethral: 1505 mL    Voided: 900 mL  Total OUT: 5005 mL    Total NET: 2029 mL    --------------------------------------------------------------------------------------    EXAM:     NEUROLOGY:  Exam: Normal, alert, NAD, no focal deficits. PERRLA.      RESPIRATORY:  Exam: Lungs clear to auscultation, Normal expansion/effort.       CARDIOVASCULAR:  Exam: S1, S2.  Regular rate and rhythm.  Peripheral edema  Cardiac Rhythm: Normal Sinus Rhythm      GI/NUTRITION:  Exam: Abdomen soft, Non-tender, Non-distended.     METABOLIC/FLUIDS/ELECTROLYTES:   lactated ringers. 1000 milliLiter(s) IV Continuous <Continuous>    HEMATOLOGIC:  [x] DVT Prophylaxis:   Transfusions:	[] PRBC	[] Platelets		[] FFP	[] Cryoprecipitate    INFECTIOUS DISEASE:  Antimicrobials/Immunologic Medications:  ertapenem  IVPB      ertapenem  IVPB 1000 milliGRAM(s) IV Intermittent once  ertapenem  IVPB 1000 milliGRAM(s) IV Intermittent every 24 hours      Tubes/Lines/Drains:  [x] Peripheral IV  [] Central Venous Line		[] R	[] L	[] IJ	[] Fem	[] SC	Date Placed:   [] Arterial Line		[] R	[] L	[] Fem	[] Rad	[] Ax	Date Placed:   [] PICC:			[] Midline		[] Mediport  [] Urinary Catheter	Date Placed:   [x] Necessity of urinary, arterial, and venous catheters discussed    VASCULAR:  Exam: Extremities warm, pink, well-perfused.    MUSCULOSKELETAL:   Exam: All extremities moving spontaneously without limitations    SKIN:   Exam: Good skin turgor, no skin breakdown.     LABS:   --------------------------------------------------------------------------------------    CBC ( @ 20:00)                              13.9                           28.35<H>  )----------------(  142<L>     --    % Neutrophils, --    % Lymphocytes, ANC: --                                  42.3    CBC ( @ 17:50)                              7.3<L>                         24.80<H>  )----------------(  237        74.0  % Neutrophils, 12.2<L>% Lymphocytes, ANC: 18.37<H>                              24.7<L>    BMP ( @ 20:00)             136     |  105     |  10    		Ca++ --      Ca 7.8<L>             ---------------------------------( 110<H>		Mg --                 4.7     |  17<L>   |  0.92  			Ph --      BMP ( @ 18:05)             136     |  107     |  9     		Ca++ --      Ca 7.3<L>             ---------------------------------( 189<H>		Mg --                 4.7     |  17<L>   |  0.91  			Ph --        LFTs ( @ 18:05)      TPro 3.9<L> / Alb 2.2<L> / TBili 0.4 / DBili -- / AST 37<H> / ALT 11 / AlkPhos 121<H>  LFTs ( @ 06:00)      TPro 5.6<L> / Alb 2.8<L> / TBili 0.4 / DBili -- / AST 40<H> / ALT 14 / AlkPhos 160<H>    Coags ( @ 17:24)  aPTT 23.5<L> / INR 1.01 / PT 11.5  Coags ( @ 06:00)  aPTT 23.2<L> / INR 0.96 / PT 10.9      ABG ( @ 20:00)      /  /  /  /  / %     Lactate:  3.5<H>  ABG ( @ 18:05)      /  /  /  /  / %     Lactate:  5.2<HH>    --------------------------------------------------------------------------------------

## 2019-01-20 NOTE — PROGRESS NOTE ADULT - PROBLEM SELECTOR PLAN 1
Neuro: Pain well controlled on current regimen. Mg restarted for seizure ppx.   CV: Hemodynamically stable, H/H stable with good response after 4uPRBC. Mild range BPs. Bleeding minimal. Bakri output minimal  Pulm: O2 sat WNL on RA, Increase ambulation, encourage incentive spirometry use  GI: NPO currently. Consider advancing diet.  : Haskins catheter in place. UOP wnl.  Heme: DVT ppx: SCDs while in bed. Consider restarting HSQ   ID: Afebrile, No signs of infection. Invanz for prophylaxis after Bakri placement.  Dispo: MERYL Rodrigues, PGY-3. Neuro: Pain well controlled on current regimen. Mg restarted for seizure ppx.   CV: Hemodynamically stable, H/H stable with good response after 4uPRBC. Mild range BPs. Bleeding minimal. Bakri output minimal. AM CBC  Pulm: O2 sat WNL on RA, Increase ambulation, encourage incentive spirometry use  GI: NPO currently. Consider advancing diet.  : Haskins catheter in place. UOP wnl. Lactate downtrending, rpt lactate.  Heme: DVT ppx: SCDs while in bed. Consider restarting HSQ   ID: Afebrile, No signs of infection. Invanz for prophylaxis after Bakri placement.  Dispo: MERYL Rodrigues, PGY-3.

## 2019-01-20 NOTE — PROGRESS NOTE ADULT - ASSESSMENT
38 P3 POD#1 s/pt rLTCS @ 35w6d for sPEC, postpartum course c/b PPH (600+2L) status post 4uPRBC in total, TXA, cytotec ID, with Bakri balloon in situ. Transferred to SICU for closer monitoring, recovering well.   1. Neuro: Pain well controlled on Tylenol and Oxycodone.  s/p magnesium sulfate x24 hours for restarted for seizure ppx.   2. CV: Hemodynamically stable, mild range hypertension. Continue Labetalol 100 BID  3. Resp: No active issues. Encourage use of incentive spirometry  4. GI: Tolerating clear liquid diet. Consider advanced to regular after removal of Bakri if patient remains stable  5. : Haskins catheter in place. UOP wnl.  6. Heme: HSQ and venodynes for VTE prophylaxis. No evidence of active bleeding  7. ID:  Invanz for infection prophylaxis while Bakri in place.  8. Dispo: Appreciate current SICU care. OB team to return to bedside in 2 hours, re-assess bleeding and remove Bakri completely if patient stable    N Sample, PGY4  pt seen and examined with nursing and Dr. Schulte

## 2019-01-20 NOTE — PROGRESS NOTE ADULT - SUBJECTIVE AND OBJECTIVE BOX
[facility] Progress Note    Patient seen and examined at bedside. Doing well in SICU. Advanced to clear liquid diet, tolerated juice and small mount of jell-o, no nausea or vomiting. Pain well controlled. Not yet OOB. Denies subjective fevers and chills. Denies headaches, visual changes.     Vital Signs Last 24 Hrs  T(C): 37.3 (20 Jan 2019 12:00), Max: 37.3 (20 Jan 2019 12:00)  T(F): 99.2 (20 Jan 2019 12:00), Max: 99.2 (20 Jan 2019 12:00)  HR: 78 (20 Jan 2019 14:30) (69 - 89)  BP: 140/94 (20 Jan 2019 14:00) (73/50 - 166/97)  BP(mean): 104 (20 Jan 2019 14:00) (51 - 114)  RR: 21 (20 Jan 2019 14:30) (15 - 26)  SpO2: 94% (20 Jan 2019 14:30) (94% - 100%)    I&O's Summary    19 Jan 2019 07:01  -  20 Jan 2019 07:00  --------------------------------------------------------  IN: 7946.5 mL / OUT: 5590 mL / NET: 2356.5 mL    20 Jan 2019 07:01  -  20 Jan 2019 14:52  --------------------------------------------------------  IN: 857.5 mL / OUT: 645 mL / NET: 212.5 mL      Gen: NAD  Abd: soft, appropriately tender, non distended  : Bakri in place minimal output, pad dry, 150ml of fluid removed from Bakri Balloon  Ext: NTTP bilaterally, venodynes on and functioning       LABS:                        12.6   27.03 )-----------( 121      ( 20 Jan 2019 09:48 )             36.8     01-20    132<L>  |  100  |  13  ----------------------------<  90  5.4<H>   |  18<L>  |  0.96    Ca    7.0<L>      20 Jan 2019 09:45  Phos  5.6     01-20  Mg     5.8     01-20    TPro  4.6<L>  /  Alb  2.4<L>  /  TBili  0.8  /  DBili  x   /  AST  47<H>  /  ALT  14  /  AlkPhos  127<H>  01-20  PT/INR - ( 19 Jan 2019 17:24 )   PT: 11.5 SEC;   INR: 1.01     PTT - ( 19 Jan 2019 17:24 )  PTT:23.5 SEC    MEDICATIONS  (STANDING):  acetaminophen   Tablet .. 975 milliGRAM(s) Oral every 6 hours  dextrose 5% + lactated ringers. 1000 milliLiter(s) (120 mL/Hr) IV Continuous <Continuous>  ertapenem  IVPB      ertapenem  IVPB 1000 milliGRAM(s) IV Intermittent once  ertapenem  IVPB 1000 milliGRAM(s) IV Intermittent every 24 hours  heparin  Injectable 5000 Unit(s) SubCutaneous every 8 hours  labetalol 100 milliGRAM(s) Oral every 12 hours  magnesium sulfate Infusion 1.5 Gm/Hr (37.5 mL/Hr) IV Continuous <Continuous>  misoprostol 200 MICROGram(s) Oral every 4 hours  oxytocin Infusion 41.667 milliUNIT(s)/Min (125 mL/Hr) IV Continuous <Continuous>  oxytocin Infusion 16.667 milliUNIT(s)/Min (50 mL/Hr) IV Continuous <Continuous>    MEDICATIONS  (PRN):  oxyCODONE    IR 5 milliGRAM(s) Oral every 6 hours PRN Severe Pain (7 - 10)

## 2019-01-20 NOTE — PROGRESS NOTE ADULT - SUBJECTIVE AND OBJECTIVE BOX
R3 Postpartum Note    Patient seen and examined at bedside, no acute overnight events. No acute complaints, pain well controlled.  Patient is not OOB, still NPO. Has not yet passed flatus. Haskins is still in place. Bakri in place. Denies headache, changes in vision, RUQ tenderness. Denies CP, SOB, fevers, chills, n/v.    Vital Signs Last 24 Hours  T(C): 36.4 (01-20-19 @ 00:00), Max: 37 (01-19-19 @ 03:35)  HR: 83 (01-20-19 @ 03:00) (68 - 89)  BP: 143/93 (01-20-19 @ 03:00) (73/50 - 186/93)  RR: 20 (01-20-19 @ 03:00) (16 - 26)  SpO2: 96% (01-20-19 @ 03:00) (96% - 100%)    I&O's Summary    18 Jan 2019 07:01  -  19 Jan 2019 07:00  --------------------------------------------------------  IN: 0 mL / OUT: 2625 mL / NET: -2625 mL    19 Jan 2019 07:01  -  20 Jan 2019 03:20  --------------------------------------------------------  IN: 7584 mL / OUT: 5300 mL / NET: 2284 mL        Physical Exam:  General: NAD  Abdomen: Soft, non-tender, non-distended, fundus firm  Incision: Pfannenstiel incision CDI, subcuticular suture closure  Pelvic: Lochia wnl    Labs:    Blood Type: O Positive  Antibody Screen: Negative  RPR: Negative               13.9   28.35 )-----------( 142      ( 01-19 @ 20:00 )             42.3                7.3    24.80 )-----------( 237      ( 01-19 @ 17:50 )             24.7                8.6    21.42 )-----------( 222      ( 01-19 @ 06:00 )             29.6                6.8    21.78 )-----------( 260      ( 01-18 @ 17:25 )             25.1                6.6    20.96 )-----------( 265      ( 01-18 @ 06:49 )             24.0                6.2    16.85 )-----------( 255      ( 01-17 @ 06:11 )             21.9                6.1    16.76 )-----------( 241      ( 01-17 @ 05:10 )             21.8         MEDICATIONS  (STANDING):  acetaminophen   Tablet .. 975 milliGRAM(s) Oral every 6 hours  ertapenem  IVPB      ertapenem  IVPB 1000 milliGRAM(s) IV Intermittent once  ertapenem  IVPB 1000 milliGRAM(s) IV Intermittent every 24 hours  lactated ringers. 1000 milliLiter(s) (50 mL/Hr) IV Continuous <Continuous>  magnesium sulfate Infusion 2 Gm/Hr (50 mL/Hr) IV Continuous <Continuous>  misoprostol 200 MICROGram(s) Oral every 4 hours  oxytocin Infusion 41.667 milliUNIT(s)/Min (125 mL/Hr) IV Continuous <Continuous>  oxytocin Infusion 16.667 milliUNIT(s)/Min (50 mL/Hr) IV Continuous <Continuous>    MEDICATIONS  (PRN):  oxyCODONE    IR 5 milliGRAM(s) Oral every 6 hours PRN Severe Pain (7 - 10)

## 2019-01-20 NOTE — PROGRESS NOTE ADULT - SUBJECTIVE AND OBJECTIVE BOX
Postop Day  __1_ s/p   C- Section    THERAPY:    [ x ] Spinal morphine _0.1___ mg  [  ] Epidural morphine ___ mg  [  ] IV PCA Hydromophone 1 mg/ml    OBJECTIVE:    Sedation Score:	  [  ] Alert	    [ x ] Drowsy        [  ] Arousable	[  ] Asleep	[  ] Unresponsive    Side Effects:	  [ x ] None	     [  ] Nausea        [  ] Pruritus        [  ] Weaknes   [  ] Numbness   [  ] Other:        ASSESSMENT/ PLAN  [  ] Continue   [  x] Discpntinue   [ x ]Documentation and Verification of current medications     Comments:

## 2019-01-20 NOTE — CHART NOTE - NSCHARTNOTEFT_GEN_A_CORE
R3 OB Note (back note due to clinical activity)    Pt evaluated at bedside. Pt feels well. Pain well controlled. Denies headache, changes in vision, RUQ tenderness. Denies CP, sOB, fevers, chills, n/v.    Vital Signs Last 24 Hours  T(C): 35.9 (01-19-19 @ 19:00), Max: 37 (01-19-19 @ 03:35)  HR: 72 (01-19-19 @ 23:00) (68 - 89)  BP: 152/92 (01-19-19 @ 23:00) (73/50 - 186/93)  RR: 21 (01-19-19 @ 23:00) (16 - 26)  SpO2: 96% (01-19-19 @ 23:00) (96% - 100%)    Gen:AAOX4, NAD  ABD: soft, nt, fundus firm  Pelvic: minimal blood   Ext: NTBL, SCDs in place               13.9   28.35 )-----------( 142      ( 01-19 @ 20:00 )             42.3     01-19 @ 20:00    136  |  105  |  10  ----------------------------<  110  4.7   |  17  |  0.92    Ca    7.8      01-19 @ 20:00    TPro  3.9  /  Alb  2.2  /  TBili  0.4  /  DBili  x   /  AST  37  /  ALT  11  /  AlkPhos  121  01-19 @ 18:05    PT/INR - ( 01-19 @ 17:24 )   PT: 11.5 SEC;   INR: 1.01     PTT - ( 01-19 @ 17:24 )  PTT:23.5 SEC    Uric Acid: (01-19 @ 17:24)  --       Fibrinogen: (01-19 @ 17:24)  357.0    LDH: (01-19 @ 17:24)  --    A/P: 37 P3 POD#1 status post rLTCS @ 35w6d for sPEC, postpartum course c/b PPH (600+2L) status post 4uPRBC in total, TXA, cytotec MA, with Bakri balloon in situ. Transferred to SICU for closer hemodynamic monitoring    Neuro: Pain well controlled on current regimen   CV: Hemodynamically stable, H/H stable with good response after 4uPRBC. Mild range BPs, recommend restart Mg for seizure ppx given firm fundus and improvement in uterine atony  Pulm: O2 sat WNL on RA, Increase ambulation, encourage incentive spirometry use  GI: NPO currently.   : Haskins catheter in place.   Heme: DVT ppx: SCDs while in bed. Consider restarting HSQ   ID: Afebrile, No signs of infection. Invanz for Bakri placement  Dispo: MERYL Rodrigues, PGY-3

## 2019-01-20 NOTE — CHART NOTE - NSCHARTNOTEFT_GEN_A_CORE
Patient seen at bedside with Dr. Schulte.  Pt has no complaints, denies chest pain, dyspnea, lightheadedness or dizziness.    /83, HR 80, SpO2 97%RA  Abd: softly distended, no rebound or guarding  : pad with minimal dark blood.  Vaginal packing x2 removed without difficulty, noted to be ~80% saturated. No active bleeding.  Bakri balloon fully deflated, and removed without incident. Approx 20cc total output.  No active bleeding noted. Fundus firm ~2-3cm above umbilicus.      37yo  POD#1 s/pt rLTCS @ 35w6d for sPEC, postpartum course c/b PPH (600+2L) status post TXA, 4upRBC, cytotec NC, now s/p Bakri balloon. Pt in SICU for close monitoring, doing well. Hemodynamically stable, Hemoglobin is stable, urine output is adequate, no active bleeding on exam.    - appreciate excellent SICU care.   - patient will move from bed to chair, in anticipation of eventual SICU discharge to postpartum unit  - advance diet as tolerated  - continue SQH for DVT ppx  - continue postop care    D[facility] Dr. Schulte  [facility] seen at bedside with Dr. Schulte.  Pt has no complaints, denies chest pain, dyspnea, lightheadedness or dizziness.    /83, HR 80, SpO2 97%RA  Abd: softly distended, no rebound or guarding  : pad with minimal dark blood.  Vaginal packing x2 removed without difficulty, noted to be ~80% saturated. No active bleeding.  Bakri balloon fully deflated, and removed without incident. Approx 20cc total output.  No active bleeding noted. Fundus firm ~2-3cm above umbilicus.      37yo  POD#1 s/pt rLTCS @ 35w6d for sPEC, postpartum course c/b PPH (600+2L) status post TXA, 4upRBC, cytotec MS, now s/p Bakri balloon. Pt in SICU for close monitoring, doing well. Hemodynamically stable, Hemoglobin is stable, urine output is adequate, no active bleeding on exam.    - appreciate excellent SICU care.   - patient will move from bed to chair, in anticipation of eventual SICU discharge to postpartum unit  - advance diet as tolerated  - continue SQH for DVT ppx  - continue postop care    Seen with Dr. Schulte  S [place], R3

## 2019-01-21 LAB
ALBUMIN SERPL ELPH-MCNC: 2.3 G/DL — LOW (ref 3.3–5)
ALP SERPL-CCNC: 103 U/L — SIGNIFICANT CHANGE UP (ref 40–120)
ALT FLD-CCNC: 10 U/L — SIGNIFICANT CHANGE UP (ref 4–33)
ANION GAP SERPL CALC-SCNC: 10 MMO/L — SIGNIFICANT CHANGE UP (ref 7–14)
APTT BLD: 27.4 SEC — LOW (ref 27.5–36.3)
AST SERPL-CCNC: 26 U/L — SIGNIFICANT CHANGE UP (ref 4–32)
BASOPHILS # BLD AUTO: 0.03 K/UL — SIGNIFICANT CHANGE UP (ref 0–0.2)
BASOPHILS NFR BLD AUTO: 0.2 % — SIGNIFICANT CHANGE UP (ref 0–2)
BILIRUB SERPL-MCNC: 0.6 MG/DL — SIGNIFICANT CHANGE UP (ref 0.2–1.2)
BUN SERPL-MCNC: 13 MG/DL — SIGNIFICANT CHANGE UP (ref 7–23)
CALCIUM SERPL-MCNC: 7.6 MG/DL — LOW (ref 8.4–10.5)
CHLORIDE SERPL-SCNC: 101 MMOL/L — SIGNIFICANT CHANGE UP (ref 98–107)
CO2 SERPL-SCNC: 23 MMOL/L — SIGNIFICANT CHANGE UP (ref 22–31)
CREAT SERPL-MCNC: 0.88 MG/DL — SIGNIFICANT CHANGE UP (ref 0.5–1.3)
EOSINOPHIL # BLD AUTO: 0.07 K/UL — SIGNIFICANT CHANGE UP (ref 0–0.5)
EOSINOPHIL NFR BLD AUTO: 0.4 % — SIGNIFICANT CHANGE UP (ref 0–6)
FIBRINOGEN PPP-MCNC: 615 MG/DL — HIGH (ref 350–510)
GLUCOSE SERPL-MCNC: 76 MG/DL — SIGNIFICANT CHANGE UP (ref 70–99)
HCT VFR BLD CALC: 33.2 % — LOW (ref 34.5–45)
HGB BLD-MCNC: 10.9 G/DL — LOW (ref 11.5–15.5)
IMM GRANULOCYTES NFR BLD AUTO: 1.8 % — HIGH (ref 0–1.5)
INR BLD: 0.97 — SIGNIFICANT CHANGE UP (ref 0.88–1.17)
LACTATE SERPL-SCNC: 1.1 MMOL/L — SIGNIFICANT CHANGE UP (ref 0.5–2)
LDH SERPL L TO P-CCNC: 566 U/L — HIGH (ref 135–225)
LYMPHOCYTES # BLD AUTO: 11.5 % — LOW (ref 13–44)
LYMPHOCYTES # BLD AUTO: 2.23 K/UL — SIGNIFICANT CHANGE UP (ref 1–3.3)
MCHC RBC-ENTMCNC: 26.2 PG — LOW (ref 27–34)
MCHC RBC-ENTMCNC: 32.8 % — SIGNIFICANT CHANGE UP (ref 32–36)
MCV RBC AUTO: 79.8 FL — LOW (ref 80–100)
MONOCYTES # BLD AUTO: 1.41 K/UL — HIGH (ref 0–0.9)
MONOCYTES NFR BLD AUTO: 7.3 % — SIGNIFICANT CHANGE UP (ref 2–14)
NEUTROPHILS # BLD AUTO: 15.28 K/UL — HIGH (ref 1.8–7.4)
NEUTROPHILS NFR BLD AUTO: 78.8 % — HIGH (ref 43–77)
NRBC # FLD: 0.05 K/UL — LOW (ref 25–125)
PLATELET # BLD AUTO: 100 K/UL — LOW (ref 150–400)
PMV BLD: 11.7 FL — SIGNIFICANT CHANGE UP (ref 7–13)
POTASSIUM SERPL-MCNC: 3.6 MMOL/L — SIGNIFICANT CHANGE UP (ref 3.5–5.3)
POTASSIUM SERPL-SCNC: 3.6 MMOL/L — SIGNIFICANT CHANGE UP (ref 3.5–5.3)
PROT SERPL-MCNC: 4.4 G/DL — LOW (ref 6–8.3)
PROTHROM AB SERPL-ACNC: 11 SEC — SIGNIFICANT CHANGE UP (ref 9.8–13.1)
RBC # BLD: 4.16 M/UL — SIGNIFICANT CHANGE UP (ref 3.8–5.2)
RBC # FLD: 23.4 % — HIGH (ref 10.3–14.5)
SODIUM SERPL-SCNC: 134 MMOL/L — LOW (ref 135–145)
URATE SERPL-MCNC: 7 MG/DL — SIGNIFICANT CHANGE UP (ref 2.5–7)
WBC # BLD: 19.37 K/UL — HIGH (ref 3.8–10.5)
WBC # FLD AUTO: 19.37 K/UL — HIGH (ref 3.8–10.5)

## 2019-01-21 RX ORDER — ACETAMINOPHEN 500 MG
3 TABLET ORAL
Qty: 0 | Refills: 0 | COMMUNITY
Start: 2019-01-21

## 2019-01-21 RX ORDER — LABETALOL HCL 100 MG
1 TABLET ORAL
Qty: 0 | Refills: 0 | COMMUNITY
Start: 2019-01-21

## 2019-01-21 RX ADMIN — Medication 100 MILLIGRAM(S): at 10:49

## 2019-01-21 RX ADMIN — OXYCODONE HYDROCHLORIDE 5 MILLIGRAM(S): 5 TABLET ORAL at 11:26

## 2019-01-21 RX ADMIN — Medication 975 MILLIGRAM(S): at 11:26

## 2019-01-21 RX ADMIN — HEPARIN SODIUM 5000 UNIT(S): 5000 INJECTION INTRAVENOUS; SUBCUTANEOUS at 13:51

## 2019-01-21 RX ADMIN — HEPARIN SODIUM 5000 UNIT(S): 5000 INJECTION INTRAVENOUS; SUBCUTANEOUS at 22:24

## 2019-01-21 RX ADMIN — Medication 100 MILLIGRAM(S): at 13:51

## 2019-01-21 RX ADMIN — Medication 1 TABLET(S): at 13:51

## 2019-01-21 RX ADMIN — Medication 975 MILLIGRAM(S): at 22:24

## 2019-01-21 RX ADMIN — Medication 100 MILLIGRAM(S): at 22:24

## 2019-01-21 RX ADMIN — Medication 975 MILLIGRAM(S): at 10:56

## 2019-01-21 RX ADMIN — Medication 975 MILLIGRAM(S): at 22:59

## 2019-01-21 RX ADMIN — OXYCODONE HYDROCHLORIDE 5 MILLIGRAM(S): 5 TABLET ORAL at 05:28

## 2019-01-21 RX ADMIN — HEPARIN SODIUM 5000 UNIT(S): 5000 INJECTION INTRAVENOUS; SUBCUTANEOUS at 05:29

## 2019-01-21 RX ADMIN — OXYCODONE HYDROCHLORIDE 5 MILLIGRAM(S): 5 TABLET ORAL at 10:56

## 2019-01-21 RX ADMIN — Medication 325 MILLIGRAM(S): at 13:50

## 2019-01-21 NOTE — PROGRESS NOTE ADULT - ASSESSMENT
A/P: 37yo POD#1 s/p rLTCS. del @ 35 6/7 2/2 sPEC.  + 2L PPH s/p Bakri, total 4U PRBC transfusion, SICU admission. S/P Mag (1/19-20). HELLP labs with elevated AST/ALT, uptrending Cr, previously downtrending Plt. Adequate BP control with antihypertensive medications. Patient is stable and doing well post-operatively. A/P: 37yo POD#2 s/p rLTCS. del @ 35 6/7 2/2 sPEC.  + 2L PPH s/p Bakri, total 4U PRBC transfusion, SICU admission. S/P Mag (1/19-20). HELLP labs with elevated AST/ALT, uptrending Cr, previously downtrending Plt. Adequate BP control with antihypertensive medications. Patient is stable and doing well post-operatively.

## 2019-01-21 NOTE — PROGRESS NOTE ADULT - PROBLEM SELECTOR PLAN 1
- Continue regular diet  - Increase ambulation.  - Continue motrin, tylenol, oxycodone PRN for pain control.  - F/U AM HELLP labs, Lactate    Soco Olvera, PGY-1  Pager# [phone]

## 2019-01-21 NOTE — PROGRESS NOTE ADULT - SUBJECTIVE AND OBJECTIVE BOX
OB Postpartum Note: Repeat  Delivery, POD#1     S: 39yo POD#1 s/p rLTCS. Her pain is well controlled. She is tolerating a regular diet and passing flatus. Denies N/V. Denies CP/SOB/lightheadedness/dizziness. Ambulating without difficulty. Voiding spontaneously. Denies HA, changes in vision, RUQ pain.    O:   Vitals:  Vital Signs Last 24 Hrs  T(C): 37.1 (2019 02:00), Max: 37.6 (2019 20:00)  T(F): 98.8 (2019 02:00), Max: 99.7 (2019 20:00)  HR: 74 (2019 02:00) (72 - 86)  BP: 136/83 (2019 02:00) (133/88 - 166/97)  BP(mean): 101 (2019 20:00) (85 - 114)  RR: 18 (2019 02:00) (15 - 26)  SpO2: 98% (2019 02:00) (94% - 98%)    MEDICATIONS  (STANDING):  acetaminophen   Tablet .. 975 milliGRAM(s) Oral every 6 hours  diphtheria/tetanus/pertussis (acellular) Vaccine (ADAcel) 0.5 milliLiter(s) IntraMuscular once  ferrous    sulfate 325 milliGRAM(s) Oral daily  heparin  Injectable 5000 Unit(s) SubCutaneous every 8 hours  ibuprofen  Tablet. 600 milliGRAM(s) Oral every 6 hours  labetalol 100 milliGRAM(s) Oral every 12 hours  oxyCODONE    IR 5 milliGRAM(s) Oral every 3 hours  prenatal multivitamin 1 Tablet(s) Oral daily    MEDICATIONS  (PRN):  diphenhydrAMINE 25 milliGRAM(s) Oral every 6 hours PRN Itching  docusate sodium 100 milliGRAM(s) Oral two times a day PRN Stool Softening  glycerin Suppository - Adult 1 Suppository(s) Rectal at bedtime PRN Constipation  lanolin Ointment 1 Application(s) Topical every 3 hours PRN Sore Nipples  oxyCODONE    IR 5 milliGRAM(s) Oral every 4 hours PRN Severe Pain (7 - 10)  simethicone 80 milliGRAM(s) Chew every 4 hours PRN Gas      Labs:  Blood type: O Positive  Rubella IgG: RPR: Negative                          12.6   27.03<H> >-----------< 121<L>    (  09:48 )             36.8                        13.4   29.16<H> >-----------< 111<L>    (  03:50 )             39.4                        13.9   28.35<H> >-----------< 142<L>    (  20:00 )             42.3                        7.3<L>   24.80<H> >-----------< 237    (  17:50 )             24.7<L>                        8.6<L>   21.42<H> >-----------< 222    (  06:00 )             29.6<L>                        6.8<LL>   21.78<H> >-----------< 260    (  17:25 )             25.1<L>                        6.6<LL>   20.96<H> >-----------< 265    (  06:49 )             24.0<L>    19 09:45      132<L>  |  100  |  13  ----------------------------<  90  5.4<H>   |  18<L>  |  0.96    19 03:50      134<L>  |  102  |  12  ----------------------------<  103<H>  4.7   |  17<L>  |  1.05    19 20:00      136  |  105  |  10  ----------------------------<  110<H>  4.7   |  17<L>  |  0.92    19 18:05      136  |  107  |  9   ----------------------------<  189<H>  4.7   |  17<L>  |  0.91    19 @ 06:00      138  |  108<H>  |  9   ----------------------------<  85  4.0   |  18<L>  |  0.86    19 @ 06:49      140  |  108<H>  |  10  ----------------------------<  94  4.3   |  20<L>  |  0.89        Ca    7.0<L>      2019 09:45  Ca    7.4<L>      2019 03:50  Ca    7.8<L>      2019 20:00  Ca    7.3<L>      2019 18:05  Ca    8.6      2019 06:00  Ca    8.7      2019 06:49  Phos  5.6<H>       Phos  5.2<H>       Mg     5.8<H>       Mg     6.8<H>         TPro  4.6<L>  /  Alb  2.4<L>  /  TBili  0.8  /  DBili  x   /  AST  47<H>  /  ALT  14  /  AlkPhos  127<H>  19 @ 03:50  TPro  3.9<L>  /  Alb  2.2<L>  /  TBili  0.4  /  DBili  x   /  AST  37<H>  /  ALT  11  /  AlkPhos  121<H>  19 @ 18:05  TPro  5.6<L>  /  Alb  2.8<L>  /  TBili  0.4  /  DBili  x   /  AST  40<H>  /  ALT  14  /  AlkPhos  160<H>  19 @ 06:00  TPro  5.8<L>  /  Alb  3.1<L>  /  TBili  0.3  /  DBili  x   /  AST  25  /  ALT  11  /  AlkPhos  145<H>  19 @ 06:49      PE:  General: NAD  Abdomen: mildly distended, appropriately tender, incision c/d/i.  Extremities: SCDs in place, no erythema    A/P: 39yo POD#1 s/p rLTCS. del @ 35 6/7 2/2 sPEC.  + 2L PPH s/p Bakri, total 4U PRBC transfusion, SICU admission. S/P Mag (-). HELLP labs with elevated AST/ALT, uptrending Cr, previously downtrending Plt. Adequate BP control with antihypertensive medications. Patient is stable and doing well post-operatively.    - Continue regular diet  - Increase ambulation.  - Continue motrin, tylenol, oxycodone PRN for pain control.  - F/U AM HELLP labs, Lactate    Soco Olvera, PGY-1  Pager# [phone] OB Postpartum Note: Repeat  Delivery, POD#1     S: 37yo POD#1 s/p rLTCS. Her pain is well controlled. She is tolerating a regular diet and passing flatus. Denies N/V. Denies CP/SOB/lightheadedness/dizziness. Ambulating without difficulty. Voiding spontaneously. Denies HA, changes in vision, RUQ pain.    O:   Vitals:  Vital Signs Last 24 Hrs  T(C): 37.1 (2019 02:00), Max: 37.6 (2019 20:00)  T(F): 98.8 (2019 02:00), Max: 99.7 (2019 20:00)  HR: 74 (2019 02:00) (72 - 86)  BP: 136/83 (2019 02:00) (133/88 - 166/97)  BP(mean): 101 (2019 20:00) (85 - 114)  RR: 18 (2019 02:00) (15 - 26)  SpO2: 98% (2019 02:00) (94% - 98%)    MEDICATIONS  (STANDING):  acetaminophen   Tablet .. 975 milliGRAM(s) Oral every 6 hours  diphtheria/tetanus/pertussis (acellular) Vaccine (ADAcel) 0.5 milliLiter(s) IntraMuscular once  ferrous    sulfate 325 milliGRAM(s) Oral daily  heparin  Injectable 5000 Unit(s) SubCutaneous every 8 hours  ibuprofen  Tablet. 600 milliGRAM(s) Oral every 6 hours  labetalol 100 milliGRAM(s) Oral every 12 hours  oxyCODONE    IR 5 milliGRAM(s) Oral every 3 hours  prenatal multivitamin 1 Tablet(s) Oral daily    MEDICATIONS  (PRN):  diphenhydrAMINE 25 milliGRAM(s) Oral every 6 hours PRN Itching  docusate sodium 100 milliGRAM(s) Oral two times a day PRN Stool Softening  glycerin Suppository - Adult 1 Suppository(s) Rectal at bedtime PRN Constipation  lanolin Ointment 1 Application(s) Topical every 3 hours PRN Sore Nipples  oxyCODONE    IR 5 milliGRAM(s) Oral every 4 hours PRN Severe Pain (7 - 10)  simethicone 80 milliGRAM(s) Chew every 4 hours PRN Gas      Labs:  Blood type: O Positive  Rubella IgG: RPR: Negative                          12.6   27.03<H> >-----------< 121<L>    (  09:48 )             36.8                        13.4   29.16<H> >-----------< 111<L>    (  03:50 )             39.4                        13.9   28.35<H> >-----------< 142<L>    (  20:00 )             42.3                        7.3<L>   24.80<H> >-----------< 237    (  17:50 )             24.7<L>                        8.6<L>   21.42<H> >-----------< 222    (  06:00 )             29.6<L>                        6.8<LL>   21.78<H> >-----------< 260    (  17:25 )             25.1<L>                        6.6<LL>   20.96<H> >-----------< 265    (  06:49 )             24.0<L>    19 09:45      132<L>  |  100  |  13  ----------------------------<  90  5.4<H>   |  18<L>  |  0.96    19 03:50      134<L>  |  102  |  12  ----------------------------<  103<H>  4.7   |  17<L>  |  1.05    19 20:00      136  |  105  |  10  ----------------------------<  110<H>  4.7   |  17<L>  |  0.92    19 18:05      136  |  107  |  9   ----------------------------<  189<H>  4.7   |  17<L>  |  0.91    19 @ 06:00      138  |  108<H>  |  9   ----------------------------<  85  4.0   |  18<L>  |  0.86    19 @ 06:49      140  |  108<H>  |  10  ----------------------------<  94  4.3   |  20<L>  |  0.89        Ca    7.0<L>      2019 09:45  Ca    7.4<L>      2019 03:50  Ca    7.8<L>      2019 20:00  Ca    7.3<L>      2019 18:05  Ca    8.6      2019 06:00  Ca    8.7      2019 06:49  Phos  5.6<H>       Phos  5.2<H>       Mg     5.8<H>       Mg     6.8<H>         TPro  4.6<L>  /  Alb  2.4<L>  /  TBili  0.8  /  DBili  x   /  AST  47<H>  /  ALT  14  /  AlkPhos  127<H>  19 @ 03:50  TPro  3.9<L>  /  Alb  2.2<L>  /  TBili  0.4  /  DBili  x   /  AST  37<H>  /  ALT  11  /  AlkPhos  121<H>  19 @ 18:05  TPro  5.6<L>  /  Alb  2.8<L>  /  TBili  0.4  /  DBili  x   /  AST  40<H>  /  ALT  14  /  AlkPhos  160<H>  19 @ 06:00  TPro  5.8<L>  /  Alb  3.1<L>  /  TBili  0.3  /  DBili  x   /  AST  25  /  ALT  11  /  AlkPhos  145<H>  19 @ 06:49      PE:  General: NAD  Abdomen: mildly distended, appropriately tender, incision c/d/i.  Extremities: SCDs in place, no erythema OB Postpartum Note: Repeat  Delivery, POD#2    S: 39yo POD#2 s/p rLTCS. Her pain is well controlled. She is tolerating a regular diet and passing flatus. Denies N/V. Denies CP/SOB/lightheadedness/dizziness. Ambulating without difficulty. Voiding spontaneously. Denies HA, changes in vision, RUQ pain.    O:   Vitals:  Vital Signs Last 24 Hrs  T(C): 37.1 (2019 02:00), Max: 37.6 (2019 20:00)  T(F): 98.8 (2019 02:00), Max: 99.7 (2019 20:00)  HR: 74 (2019 02:00) (72 - 86)  BP: 136/83 (2019 02:00) (133/88 - 166/97)  BP(mean): 101 (2019 20:00) (85 - 114)  RR: 18 (2019 02:00) (15 - 26)  SpO2: 98% (2019 02:00) (94% - 98%)    MEDICATIONS  (STANDING):  acetaminophen   Tablet .. 975 milliGRAM(s) Oral every 6 hours  diphtheria/tetanus/pertussis (acellular) Vaccine (ADAcel) 0.5 milliLiter(s) IntraMuscular once  ferrous    sulfate 325 milliGRAM(s) Oral daily  heparin  Injectable 5000 Unit(s) SubCutaneous every 8 hours  ibuprofen  Tablet. 600 milliGRAM(s) Oral every 6 hours  labetalol 100 milliGRAM(s) Oral every 12 hours  oxyCODONE    IR 5 milliGRAM(s) Oral every 3 hours  prenatal multivitamin 1 Tablet(s) Oral daily    MEDICATIONS  (PRN):  diphenhydrAMINE 25 milliGRAM(s) Oral every 6 hours PRN Itching  docusate sodium 100 milliGRAM(s) Oral two times a day PRN Stool Softening  glycerin Suppository - Adult 1 Suppository(s) Rectal at bedtime PRN Constipation  lanolin Ointment 1 Application(s) Topical every 3 hours PRN Sore Nipples  oxyCODONE    IR 5 milliGRAM(s) Oral every 4 hours PRN Severe Pain (7 - 10)  simethicone 80 milliGRAM(s) Chew every 4 hours PRN Gas      Labs:  Blood type: O Positive  Rubella IgG: RPR: Negative                          12.6   27.03<H> >-----------< 121<L>    (  09:48 )             36.8                        13.4   29.16<H> >-----------< 111<L>    (  03:50 )             39.4                        13.9   28.35<H> >-----------< 142<L>    (  20:00 )             42.3                        7.3<L>   24.80<H> >-----------< 237    (  17:50 )             24.7<L>                        8.6<L>   21.42<H> >-----------< 222    (  06:00 )             29.6<L>                        6.8<LL>   21.78<H> >-----------< 260    (  17:25 )             25.1<L>                        6.6<LL>   20.96<H> >-----------< 265    (  06:49 )             24.0<L>    19 09:45      132<L>  |  100  |  13  ----------------------------<  90  5.4<H>   |  18<L>  |  0.96    19 03:50      134<L>  |  102  |  12  ----------------------------<  103<H>  4.7   |  17<L>  |  1.05    19 20:00      136  |  105  |  10  ----------------------------<  110<H>  4.7   |  17<L>  |  0.92    19 18:05      136  |  107  |  9   ----------------------------<  189<H>  4.7   |  17<L>  |  0.91    19 @ 06:00      138  |  108<H>  |  9   ----------------------------<  85  4.0   |  18<L>  |  0.86    19 @ 06:49      140  |  108<H>  |  10  ----------------------------<  94  4.3   |  20<L>  |  0.89        Ca    7.0<L>      2019 09:45  Ca    7.4<L>      2019 03:50  Ca    7.8<L>      2019 20:00  Ca    7.3<L>      2019 18:05  Ca    8.6      2019 06:00  Ca    8.7      2019 06:49  Phos  5.6<H>       Phos  5.2<H>       Mg     5.8<H>       Mg     6.8<H>         TPro  4.6<L>  /  Alb  2.4<L>  /  TBili  0.8  /  DBili  x   /  AST  47<H>  /  ALT  14  /  AlkPhos  127<H>  19 @ 03:50  TPro  3.9<L>  /  Alb  2.2<L>  /  TBili  0.4  /  DBili  x   /  AST  37<H>  /  ALT  11  /  AlkPhos  121<H>  19 @ 18:05  TPro  5.6<L>  /  Alb  2.8<L>  /  TBili  0.4  /  DBili  x   /  AST  40<H>  /  ALT  14  /  AlkPhos  160<H>  19 @ 06:00  TPro  5.8<L>  /  Alb  3.1<L>  /  TBili  0.3  /  DBili  x   /  AST  25  /  ALT  11  /  AlkPhos  145<H>  19 @ 06:49      PE:  General: NAD  Abdomen: mildly distended, appropriately tender, incision c/d/i.  Extremities: SCDs in place, no erythema

## 2019-01-21 NOTE — LACTATION INITIAL EVALUATION - INTERVENTION OUTCOME
Offered pt with assistance with breastfeeding-pt recently fed formula. Discussed risks of supplementing with formula while breastfeeding. Reviewed feeding on demand, recognizing feeding cues and utilizing feeding log. Safe skin to skin, safe sleep and rooming in discussed.

## 2019-01-22 ENCOUNTER — TRANSCRIPTION ENCOUNTER (OUTPATIENT)
Age: 39
End: 2019-01-22

## 2019-01-22 VITALS
SYSTOLIC BLOOD PRESSURE: 146 MMHG | HEART RATE: 81 BPM | OXYGEN SATURATION: 96 % | RESPIRATION RATE: 17 BRPM | DIASTOLIC BLOOD PRESSURE: 80 MMHG | TEMPERATURE: 98 F

## 2019-01-22 LAB
BASOPHILS # BLD AUTO: 0.02 K/UL — SIGNIFICANT CHANGE UP (ref 0–0.2)
BASOPHILS NFR BLD AUTO: 0.1 % — SIGNIFICANT CHANGE UP (ref 0–2)
EOSINOPHIL # BLD AUTO: 0.15 K/UL — SIGNIFICANT CHANGE UP (ref 0–0.5)
EOSINOPHIL NFR BLD AUTO: 0.9 % — SIGNIFICANT CHANGE UP (ref 0–6)
HCT VFR BLD CALC: 31.6 % — LOW (ref 34.5–45)
HGB BLD-MCNC: 10.1 G/DL — LOW (ref 11.5–15.5)
IMM GRANULOCYTES NFR BLD AUTO: 1.1 % — SIGNIFICANT CHANGE UP (ref 0–1.5)
LYMPHOCYTES # BLD AUTO: 14.7 % — SIGNIFICANT CHANGE UP (ref 13–44)
LYMPHOCYTES # BLD AUTO: 2.39 K/UL — SIGNIFICANT CHANGE UP (ref 1–3.3)
MCHC RBC-ENTMCNC: 26 PG — LOW (ref 27–34)
MCHC RBC-ENTMCNC: 32 % — SIGNIFICANT CHANGE UP (ref 32–36)
MCV RBC AUTO: 81.4 FL — SIGNIFICANT CHANGE UP (ref 80–100)
MONOCYTES # BLD AUTO: 1.22 K/UL — HIGH (ref 0–0.9)
MONOCYTES NFR BLD AUTO: 7.5 % — SIGNIFICANT CHANGE UP (ref 2–14)
NEUTROPHILS # BLD AUTO: 12.35 K/UL — HIGH (ref 1.8–7.4)
NEUTROPHILS NFR BLD AUTO: 75.7 % — SIGNIFICANT CHANGE UP (ref 43–77)
NRBC # FLD: 0.02 K/UL — LOW (ref 25–125)
PLATELET # BLD AUTO: 101 K/UL — LOW (ref 150–400)
PMV BLD: 11.5 FL — SIGNIFICANT CHANGE UP (ref 7–13)
RBC # BLD: 3.88 M/UL — SIGNIFICANT CHANGE UP (ref 3.8–5.2)
RBC # FLD: 24.2 % — HIGH (ref 10.3–14.5)
TRANSFERRIN SERPL-MCNC: 362 MG/DL — HIGH (ref 200–360)
WBC # BLD: 16.31 K/UL — HIGH (ref 3.8–10.5)
WBC # FLD AUTO: 16.31 K/UL — HIGH (ref 3.8–10.5)

## 2019-01-22 RX ADMIN — Medication 975 MILLIGRAM(S): at 05:21

## 2019-01-22 RX ADMIN — Medication 975 MILLIGRAM(S): at 05:54

## 2019-01-22 RX ADMIN — Medication 100 MILLIGRAM(S): at 11:54

## 2019-01-22 RX ADMIN — HEPARIN SODIUM 5000 UNIT(S): 5000 INJECTION INTRAVENOUS; SUBCUTANEOUS at 05:19

## 2019-01-22 NOTE — DISCHARGE NOTE OB - PLAN OF CARE
full recovery pelvic rest x 6wks  no heavy lifting x 8wks Improve anemia, iron rich foods and daily iron in addition to PNV

## 2019-01-22 NOTE — PROGRESS NOTE ADULT - PROBLEM SELECTOR PLAN 1
- Continue labetalol 100 BID  - Continue with oral analgesics  - Increase ambulation  - Continue regular diet  - Discharge planning    Asha Duarte PGY-1

## 2019-01-22 NOTE — DISCHARGE NOTE OB - HOSPITAL COURSE
The patient had Repeat  section at 36 2/7wks due to severe preeclampsia. A live female infant was born Apgar 8-9, 2820g. Delivery was complicated by postpartum hemorrhage requiring transfusion of 4 units PRBCS and Bakri Balloon placement. The patient received magnesium sulfate for seizure prophylaxis and antihypertensives. The remainder of hospital course was uneventful. The patient was discharged on POD3 in stable condition with instructions to f/u with Primary OB in 1wk for BP check.

## 2019-01-22 NOTE — DISCHARGE NOTE OB - PATIENT PORTAL LINK FT
You can access the [email or web address], offered by [facility], by registering with the following website: [email or web address]

## 2019-01-22 NOTE — PROGRESS NOTE ADULT - ASSESSMENT
A/P: 39yo POD#3 s/p LTCS, c/b a 2L PPH status post uterotonics and bakri balloon. She received a total of 4units of pRBCs during the hospital stay. CBC appropriate. Peripartum course also c/b sPEC status post magnesium. Blood pressures have been controlled with labetalol. Patient is doing well post-operatively.

## 2019-01-22 NOTE — DISCHARGE NOTE OB - BREAST MILK SUPPORTS STABLE NEWBORN BLOOD SUGAR
no abdominal pain, no bloating, no constipation, no diarrhea, no nausea and no vomiting. Statement Selected

## 2019-01-22 NOTE — DISCHARGE NOTE OB - CARE PROVIDER_API CALL
Kohanim, Behnam (MD), Obstetrics and Gynecology  [address] [facility]  [address] [place] [address]  Phone: [phone]  Fax: [phone]

## 2019-01-22 NOTE — DISCHARGE NOTE OB - CARE PLAN
Goal:	full recovery  Assessment and plan of treatment:	pelvic rest x 6wks  no heavy lifting x 8wks  Goal:	Improve anemia, iron rich foods and daily iron in addition to PNV Principal Discharge DX:	 delivery  Goal:	full recovery  Assessment and plan of treatment:	pelvic rest x 6wks  no heavy lifting x 8wks  Secondary Diagnosis:	Third-stage postpartum hemorrhage  Secondary Diagnosis:	Pre-eclampsia in third trimester  Goal:	Improve anemia, iron rich foods and daily iron in addition to PNV

## 2019-01-22 NOTE — DISCHARGE NOTE OB - MEDICATION SUMMARY - MEDICATIONS TO TAKE
I will START or STAY ON the medications listed below when I get home from the hospital:    acetaminophen 325 mg oral tablet  -- 3 tab(s) by mouth every 6 hours, As Needed  -- Indication: For  delivery delivered    labetalol 100 mg oral tablet  -- 1 tab(s) by mouth every 12 hours  -- Indication: For Pre-eclampsia    Prenatal Multivitamins with Folic Acid 1 mg oral tablet  -- 1 tab(s) by mouth once a day  -- Indication: For  delivery delivered

## 2019-01-22 NOTE — PROGRESS NOTE ADULT - SUBJECTIVE AND OBJECTIVE BOX
OB Postpartum Note:  Delivery, POD#3    S: 37yo POD#3 s/p LTCS. The patient feels well.  Pain is well controlled. She is tolerating a regular diet and passing flatus. She is voiding spontaneously, and ambulating without difficulty. Denies CP/SOB. Denies lightheadedness/dizziness. Denies N/V.    O:  Vitals:  Vital Signs Last 24 Hrs  T(C): 36.7 (2019 05:43), Max: 36.7 (2019 10:13)  T(F): 98 (2019 05:43), Max: 98.1 (2019 10:13)  HR: 81 (2019 05:43) (74 - 85)  BP: 146/80 (2019 05:43) (132/72 - 146/80)  BP(mean): --  RR: 17 (2019 05:43) (17 - 18)  SpO2: 96% (2019 05:43) (96% - 99%)    MEDICATIONS  (STANDING):  acetaminophen   Tablet .. 975 milliGRAM(s) Oral every 6 hours  diphtheria/tetanus/pertussis (acellular) Vaccine (ADAcel) 0.5 milliLiter(s) IntraMuscular once  ferrous    sulfate 325 milliGRAM(s) Oral daily  heparin  Injectable 5000 Unit(s) SubCutaneous every 8 hours  ibuprofen  Tablet. 600 milliGRAM(s) Oral every 6 hours  labetalol 100 milliGRAM(s) Oral every 12 hours  oxyCODONE    IR 5 milliGRAM(s) Oral every 3 hours  prenatal multivitamin 1 Tablet(s) Oral daily    MEDICATIONS  (PRN):  diphenhydrAMINE 25 milliGRAM(s) Oral every 6 hours PRN Itching  docusate sodium 100 milliGRAM(s) Oral two times a day PRN Stool Softening  glycerin Suppository - Adult 1 Suppository(s) Rectal at bedtime PRN Constipation  lanolin Ointment 1 Application(s) Topical every 3 hours PRN Sore Nipples  oxyCODONE    IR 5 milliGRAM(s) Oral every 4 hours PRN Severe Pain (7 - 10)  simethicone 80 milliGRAM(s) Chew every 4 hours PRN Gas      LABS:  Blood type: O Positive  Rubella IgG: RPR: Negative                          10.1<L>   16.31<H> >-----------< 101<L>    (  06:15 )             31.6<L>                        10.9<L>   19.37<H> >-----------< 100<L>    (  06:34 )             33.2<L>                        12.6   27.03<H> >-----------< 121<L>    (  09:48 )             36.8                        13.4   29.16<H> >-----------< 111<L>    (  03:50 )             39.4                        13.9   28.35<H> >-----------< 142<L>    (  20:00 )             42.3                        7.3<L>   24.80<H> >-----------< 237    (  17:50 )             24.7<L>    19 06:34      134<L>  |  101  |  13  ----------------------------<  76  3.6   |  23  |  0.88    19 09:45      132<L>  |  100  |  13  ----------------------------<  90  5.4<H>   |  18<L>  |  0.96    19 03:50      134<L>  |  102  |  12  ----------------------------<  103<H>  4.7   |  17<L>  |  1.05    19 20:00      136  |  105  |  10  ----------------------------<  110<H>  4.7   |  17<L>  |  0.92    19 18:05      136  |  107  |  9   ----------------------------<  189<H>  4.7   |  17<L>  |  0.91        Ca    7.6<L>      2019 06:34  Ca    7.0<L>      2019 09:45  Ca    7.4<L>      2019 03:50  Ca    7.8<L>      2019 20:00  Ca    7.3<L>      2019 18:05  Phos  5.6<H>       Phos  5.2<H>       Mg     5.8<H>       Mg     6.8<H>         TPro  4.4<L>  /  Alb  2.3<L>  /  TBili  0.6  /  DBili  x   /  AST  26  /  ALT  10  /  AlkPhos  103  19 @ 06:34  TPro  4.6<L>  /  Alb  2.4<L>  /  TBili  0.8  /  DBili  x   /  AST  47<H>  /  ALT  14  /  AlkPhos  127<H>  19 @ 03:50  TPro  3.9<L>  /  Alb  2.2<L>  /  TBili  0.4  /  DBili  x   /  AST  37<H>  /  ALT  11  /  AlkPhos  121<H>  19 @ 18:05          Physical exam:  Gen: NAD  Abdomen: Soft, nontender, no distension , firm uterine fundus at umbilicus.  Incision: Clean, dry, and intact   Pelvic: Normal lochia noted  Ext: No calf tenderness

## 2019-01-23 DIAGNOSIS — O09.523 SUPERVISION OF ELDERLY MULTIGRAVIDA, THIRD TRIMESTER: ICD-10-CM

## 2019-01-23 DIAGNOSIS — O28.3 ABNORMAL ULTRASONIC FINDING ON ANTENATAL SCREENING OF MOTHER: ICD-10-CM

## 2019-01-23 DIAGNOSIS — O36.5930 MATERNAL CARE FOR OTHER KNOWN OR SUSPECTED POOR FETAL GROWTH, THIRD TRIMESTER, NOT APPLICABLE OR UNSPECIFIED: ICD-10-CM

## 2019-02-06 LAB — SURGICAL PATHOLOGY STUDY: SIGNIFICANT CHANGE UP

## 2020-09-01 NOTE — PATIENT PROFILE OB - WEIGHT PRESENT IN KG
Keep bandages and Ace wrap applied to the chest to help with skin flaps    Follow-up as scheduled on 8 September
82

## 2020-11-23 NOTE — PATIENT PROFILE OB - CONTRAINDICATIONS & PRECAUTIONS (SELECT ALL THAT APPLY)
Patient remains isolative to room, verbalizing no desire to be around these people. Denies thoughts of harm to self or others,denies seeing things not there or hearing voices. Patient verbalizes that her depression is 5 out of 10 now do to this nurse educated that attending groups is part of her therapy that medication alone can not be relied on to cure her illness and that it would be beneficial in part of not only helping her to cope but is looked at by doctor. This will show that willing to help self to get better. Patient states that that comment is what caused her anxiety to increase. Again I stressed the importance of attending to help her along in recovery. Patient verbalized that she just came out of 90 days rehab and does not feel the need to attend more therapy. Patient displays by attitude and verbal tone wanting to be discharged soon. States that she only came here to get her medications because boyfriend misplaced hers when discharged from [facility]. \"I don't want to be around people , just let me sleep. \"  Verbalizes depression 5 out of 10 due to wanting to go home. Appetite is fine and sleep is all I want to do. Compliant with medications and refused to attend groups. Safety rounds continue. Patient/surrogate refused vaccine...

## 2021-04-30 NOTE — ED ADULT TRIAGE NOTE - STATUS:
Applied Birth Control Pills Pregnancy And Lactation Text: This medication should be avoided if pregnant and for the first 30 days post-partum.

## 2024-09-30 NOTE — LACTATION INITIAL EVALUATION - MEDICATIONS BROUGHT TO HOSPITAL, PROFILE
Patient: Leroy Thao    Procedure Summary       Date: 09/30/24 Room / Location: [place] [address] / SURGERY [facility]    Anesthesia Start: 0655 Anesthesia Stop: 0748    Procedure: RESECTION OF RIGHT ABDOMINAL WALL MASS (Right: Abdomen) Diagnosis: (RIGHT ABDOMINAL WALL MASS)    Surgeons: Emiliano Garza M.D. Responsible Provider: Juan Puckett M.D.    Anesthesia Type: general ASA Status: 2            Final Anesthesia Type: general  Last vitals  BP   Blood Pressure: 108/58    Temp   36.1 °C (97 °F)    Pulse   66   Resp   20    SpO2   99 %      Anesthesia Post Evaluation    Patient location during evaluation: PACU  Patient participation: complete - patient participated  Level of consciousness: awake  Pain score: 2    Airway patency: patent  Anesthetic complications: no  Cardiovascular status: hemodynamically stable  Respiratory status: acceptable  Hydration status: acceptable    PONV: none          No notable events documented.     Nurse Pain Score: 5 (NPRS)          
no

## 2025-03-27 NOTE — DISCHARGE NOTE OB - VISION (WITH CORRECTIVE LENSES IF THE PATIENT USUALLY WEARS THEM):
Medicare Message     Important Message from Medicare regarding Discharge Appeal Rights Explained to patient/caregiver; Signed/date by patient/caregiver   Date IMM was signed 3/25/2025   Time IMM was signed [id number]      Normal vision: sees adequately in most situations; can see medication labels, newsprint